# Patient Record
Sex: FEMALE | Race: WHITE | Employment: OTHER | URBAN - NONMETROPOLITAN AREA
[De-identification: names, ages, dates, MRNs, and addresses within clinical notes are randomized per-mention and may not be internally consistent; named-entity substitution may affect disease eponyms.]

---

## 2017-01-05 ENCOUNTER — OFFICE VISIT (OUTPATIENT)
Dept: AUDIOLOGY | Facility: OTHER | Age: 72
End: 2017-01-05
Attending: AUDIOLOGIST
Payer: MEDICARE

## 2017-01-05 DIAGNOSIS — H90.3 SENSORINEURAL HEARING LOSS, ASYMMETRICAL: Primary | ICD-10-CM

## 2017-01-05 PROCEDURE — 92557 COMPREHENSIVE HEARING TEST: CPT | Performed by: AUDIOLOGIST

## 2017-01-05 PROCEDURE — 92567 TYMPANOMETRY: CPT | Performed by: AUDIOLOGIST

## 2017-01-05 NOTE — PROGRESS NOTES
Audiology Evaluation Completed. Please refer SCANNED AUDIOGRAM and/or TYMPANOGRAM for BACKGROUND, RESULTS, RECOMMENDATIONS.    UNDER RECOMMENDATIONS ON AUDIOGRAM PATIENT REFERRED TO ENT WITH SYMPTOMS      Andree BOLDEN, [facility]  Audiologist #[id number]         [facility] REFERRAL/ORDER NEEDED TO ENT BY AUDIOLOGY completed

## 2017-01-18 ENCOUNTER — OFFICE VISIT (OUTPATIENT)
Dept: OTOLARYNGOLOGY | Facility: OTHER | Age: 72
End: 2017-01-18
Attending: PHYSICIAN ASSISTANT
Payer: MEDICARE

## 2017-01-18 VITALS
DIASTOLIC BLOOD PRESSURE: 70 MMHG | SYSTOLIC BLOOD PRESSURE: 112 MMHG | HEART RATE: 90 BPM | TEMPERATURE: 97.6 F | BODY MASS INDEX: 21.98 KG/M2 | WEIGHT: 145 LBS | HEIGHT: 68 IN

## 2017-01-18 DIAGNOSIS — H90.3 SENSORINEURAL HEARING LOSS, ASYMMETRICAL: Primary | ICD-10-CM

## 2017-01-18 PROCEDURE — 99214 OFFICE O/P EST MOD 30 MIN: CPT | Performed by: PHYSICIAN ASSISTANT

## 2017-01-18 PROCEDURE — 99212 OFFICE O/P EST SF 10 MIN: CPT

## 2017-01-18 ASSESSMENT — PAIN SCALES - GENERAL: PAINLEVEL: NO PAIN (0)

## 2017-01-18 NOTE — NURSING NOTE
"Chief Complaint   Patient presents with     Consult     ASNHL       Initial /70 mmHg  Pulse 90  Temp(Src) 97.6  F (36.4  C) (Tympanic)  Ht 5' 8\" (1.727 m)  Wt 145 lb (65.772 kg)  BMI 22.05 kg/m2 Estimated body mass index is 22.05 kg/(m^2) as calculated from the following:    Height as of this encounter: 5' 8\" (1.727 m).    Weight as of this encounter: 145 lb (65.772 kg).  BP completed using cuff size: kristin Wall      "

## 2017-01-18 NOTE — PATIENT INSTRUCTIONS
:  I have discussed options with the patient.  MRI of inner ear to be considered.   Will review prior MRI Brain, to check internal auditory canal.     If you develop vertigo, ear pain, decrease in hearing or fluctuating hearing loss contact staff  If there are concerns or questions, Call [phone] and ask for nurse    Recheck left ear in 6 months (hearing test)

## 2017-01-18 NOTE — MR AVS SNAPSHOT
"              After Visit Summary   1/18/2017    Alycia France    MRN: [id number]           Patient Information     Date Of Birth          1945        Visit Information        Provider Department      1/18/2017 1:15 PM Eugenia Gee PA-C [facility]        Today's Diagnoses     Sensorineural hearing loss, asymmetrical    -  1       Care Instructions    :  I have discussed options with the patient.  MRI of inner ear to be considered.   Will review prior MRI Brain, to check internal auditory canal.     If you develop vertigo, ear pain, decrease in hearing or fluctuating hearing loss contact staff  If there are concerns or questions, Call [phone] and ask for nurse    Recheck left ear in 6 months (hearing test)        Follow-ups after your visit        Who to contact     If you have questions or need follow up information about today's clinic visit or your schedule please contact [facility] directly at [phone].  Normal or non-critical lab and imaging results will be communicated to you by MyChart, letter or phone within 4 business days after the clinic has received the results. If you do not hear from us within 7 days, please contact the clinic through [employer] or phone. If you have a critical or abnormal lab result, we will notify you by phone as soon as possible.  Submit refill requests through Materia or call your pharmacy and they will forward the refill request to us. Please allow 3 business days for your refill to be completed.          Additional Information About Your Visit        MyChart Information     Materia lets you send messages to your doctor, view your test results, renew your prescriptions, schedule appointments and more. To sign up, go to [email or web address] . Click on \"Log in\" on the left side of the screen, which will take you to the Welcome page. Then click on \"Sign up Now\" on the right side of the page.     You will be asked to enter the access code listed " "below, as well as some personal information. Please follow the directions to create your username and password.     Your access code is: [email or web address]  Expires: 3/28/2017  2:34 PM     Your access code will  in 90 days. If you need help or a new code, please call your [facility] clinic or [phone].        Care EveryWhere ID     This is your Care EveryWhere ID. This could be used by other organizations to access your [facility] medical records  [id number]        Your Vitals Were     Pulse Temperature Height BMI (Body Mass Index)          90 97.6  F (36.4  C) (Tympanic) 5' 8\" (1.727 m) 22.05 kg/m2         Blood Pressure from Last 3 Encounters:   17 112/70   16 118/68   16 110/66    Weight from Last 3 Encounters:   17 145 lb (65.772 kg)   16 145 lb (65.772 kg)   16 145 lb (65.772 kg)              Today, you had the following     No orders found for display       Primary Care Provider Office Phone # Fax #    Sugar Quiles [phone]       [place] [address]        Thank you!     Thank you for choosing [facility]  for your care. Our goal is always to provide you with excellent care. Hearing back from our patients is one way we can continue to improve our services. Please take a few minutes to complete the written survey that you may receive in the mail after your visit with us. Thank you!             Your Updated Medication List - Protect others around you: Learn how to safely use, store and throw away your medicines at www.disposemymeds.org.          This list is accurate as of: 17  1:28 PM.  Always use your most recent med list.                   Brand Name Dispense Instructions for use    ADDERALL 30 MG per tablet   Generic drug:  amphetamine-dextroamphetamine      Take 20 mg by mouth 2 times daily       DIPHENHYDRAMINE HCL PO      Take 25 mg by mouth every 6 hours as needed       LATUDA PO      Take 80 " mg by mouth daily       VESICARE PO      Take 10 mg by mouth daily       VITAMIN D (CHOLECALCIFEROL) PO      Take 1,000 Units by mouth daily

## 2017-01-18 NOTE — PROGRESS NOTES
"    Chief Complaint   Patient presents with     Consult     ASNHL       Alycia has no hx of COM or otologic surgeries.    She has no otalgia, otorrhea.    She has no vertigo.    Alycia reports gradual hearing loss. Denies sudden changes.    Alycia has several siblings with hearing aids. No congential hearing loss.    Denies tinnitus. She has no noise exposure.       Audiogram  Mild ASNHL, L>R  Past Medical History   Diagnosis Date     ADD (attention deficit disorder)      Depression      Tardive dyskinesia      COPD (chronic obstructive pulmonary disease) (H)      Anxiety       No Known Allergies  Current Outpatient Prescriptions   Medication     Solifenacin Succinate (VESICARE PO)     Lurasidone HCl (LATUDA PO)     DIPHENHYDRAMINE HCL PO     VITAMIN D, CHOLECALCIFEROL, PO     amphetamine-dextroamphetamine (ADDERALL) 30 MG tablet     No current facility-administered medications for this visit.      ROS: 10 point ROS neg other than the symptoms noted above in the HPI.    /70 mmHg  Pulse 90  Temp(Src) 97.6  F (36.4  C) (Tympanic)  Ht 5' 8\" (1.727 m)  Wt 145 lb (65.772 kg)  BMI 22.05 kg/m2    General - The patient is well nourished and well developed, and appears to have good nutritional status.  Alert and oriented to person and place, answers questions and cooperates with examination appropriately.   Head and Face - Normocephalic and atraumatic, with no gross asymmetry noted.  The facial nerve is intact, with strong symmetric movements.  Slow speech, jaw movement throughout exam.    Voice and Breathing - The patient was breathing comfortably without the use of accessory muscles. There was no wheezing, stridor, or stertor.  The patients voice was clear and strong, and had appropriate pitch and quality.  Ears -The external auditory canals are with minor cerumen the tympanic membranes are intact without effusion, retraction or mass.  Bony landmarks are intact.canals were cleaned under microscopy with cerumen loops. " Tolerated well. NO effusion or retraction.   Eyes - Extraocular movements intact, and the pupils were reactive to light.  Sclera were not icteric or injected, conjunctiva were pink and moist.  Mouth - Examination of the oral cavity showed scattered fillings and periodontitis, with ulcerated lesion against lower incisors appears odontogenic. Tenderness to palpation lower incisors, No thrush or other oral lesion.  Throat - The walls of the oropharynx were smooth, pink, moist, symmetric, and had no lesions or ulcerations.  The tonsillar pillars and soft palate were symmetric.  The uvula was midline on elevation.     Neck - Normal midline excursion of the laryngotracheal complex during swallowing.  Full range of motion on passive movement.  Palpation of the occipital, submental, submandibular, internal jugular chain, and supraclavicular nodes did not demonstrate any abnormal lymph nodes or masses.  Palpation of the thyroid was soft and smooth, with no nodules or goiter appreciated.  The trachea was mobile and midline.  Nose - External contour is symmetric, no gross deflection or scars.  Nasal mucosa is pink and moist with no abnormal mucus.  The septum was intact, turbinates of normal size and position.  No polyps, masses, or purulence noted on examination.        Mild high frequency SNHL, LEFT>RIGHT  Type A tympanograms.         ASSESSMENT:    ICD-10-CM    1. Sensorineural hearing loss, asymmetrical H90.5 AUDIOLOGY ADULT REFERRAL            I have discussed options with the patient.  MRI of inner ear to be considered.   Will review prior MRI Brain, to check internal auditory canal.     If you develop vertigo, ear pain, decrease in hearing or fluctuating hearing loss contact staff    Recheck left ear in 6 months (hearing test)   I have discussed options with the patient.  MRI of the IAC was ordered at this time to rule out retrocochlear lesions.    The loss of hearing in the one ear leaves only one that is functioning  well.  It becomes imperative that the remaining ear be protected from noise exposure and if hearing loss were to occur in the remaining ear, the patient needs to be seen in a few days so as to commence timely work-up and management.  The specifics of this were reviewed with the patient.      Eugenia Gee PA-C  ENT  [facility] [place], [place]  [phone]

## 2017-01-19 ENCOUNTER — TELEPHONE (OUTPATIENT)
Dept: OTOLARYNGOLOGY | Facility: OTHER | Age: 72
End: 2017-01-19

## 2017-01-19 NOTE — TELEPHONE ENCOUNTER
I spoke with pt to notify her that Eugenia would recommend a new Mri iac.  Her last one is over 2 years old.  Pt has an audiogram in 6 months.  She would like to do the Mri iac after that.

## 2017-07-18 ENCOUNTER — OFFICE VISIT (OUTPATIENT)
Dept: AUDIOLOGY | Facility: OTHER | Age: 72
End: 2017-07-18
Attending: AUDIOLOGIST
Payer: MEDICARE

## 2017-07-18 DIAGNOSIS — H90.3 SENSORINEURAL HEARING LOSS, BILATERAL: Primary | ICD-10-CM

## 2017-07-18 DIAGNOSIS — H90.3 SENSORINEURAL HEARING LOSS, ASYMMETRICAL: ICD-10-CM

## 2017-07-18 PROCEDURE — 92552 PURE TONE AUDIOMETRY AIR: CPT | Performed by: AUDIOLOGIST

## 2017-07-18 PROCEDURE — 92556 SPEECH AUDIOMETRY COMPLETE: CPT | Performed by: AUDIOLOGIST

## 2017-07-18 NOTE — PROGRESS NOTES
Audiology Evaluation Completed. Please refer SCANNED AUDIOGRAM and/or TYMPANOGRAM for BACKGROUND, RESULTS, RECOMMENDATIONS.    Andree BOLDEN, [facility]-A  Audiologist #[id number]

## 2017-07-18 NOTE — MR AVS SNAPSHOT
"              After Visit Summary   2017    Alycia France    MRN: [id number]           Patient Information     Date Of Birth          1945        Visit Information        Provider Department      2017 1:00 PM Andree Deras AuD [facility]        Today's Diagnoses     Sensorineural hearing loss, bilateral    -  1    Sensorineural hearing loss, asymmetrical           Follow-ups after your visit        Who to contact     If you have questions or need follow up information about today's clinic visit or your schedule please contact [facility] directly at [phone].  Normal or non-critical lab and imaging results will be communicated to you by Ev[employer], letter or phone within 4 business days after the clinic has received the results. If you do not hear from us within 7 days, please contact the clinic through [employer] or phone. If you have a critical or abnormal lab result, we will notify you by phone as soon as possible.  Submit refill requests through Metaps or call your pharmacy and they will forward the refill request to us. Please allow 3 business days for your refill to be completed.          Additional Information About Your Visit        MyChart Information     Metaps lets you send messages to your doctor, view your test results, renew your prescriptions, schedule appointments and more. To sign up, go to [email or web address] . Click on \"Log in\" on the left side of the screen, which will take you to the Welcome page. Then click on \"Sign up Now\" on the right side of the page.     You will be asked to enter the access code listed below, as well as some personal information. Please follow the directions to create your username and password.     Your access code is: [email or web address]  Expires: 10/16/2017  1:18 PM     Your access code will  in 90 days. If you need help or a new code, please call your [facility] or [phone].        Care EveryWhere ID     This is " your Care EveryWhere ID. This could be used by other organizations to access your [facility] medical records  [id number]         Blood Pressure from Last 3 Encounters:   01/18/17 112/70   12/28/16 118/68   07/28/16 110/66    Weight from Last 3 Encounters:   01/18/17 145 lb (65.8 kg)   12/28/16 145 lb (65.8 kg)   07/28/16 145 lb (65.8 kg)              We Performed the Following     AUDIOGRAM/TYMPANOGRAM - INTERFACE     AUDIOLOGY ADULT REFERRAL        Primary Care Provider Office Phone # Fax #    Sugar Quiles [phone]       [address] [address]        Equal Access to Services     DIANE GAN : Hadii boom alvao [place], waaxda luqadaha, qaybta kaalmada adeegyada, odilia loo . So [facility] [phone].    ATENCIÓN: Si habla español, tiene a welch disposición servicios gratuitos de asistencia lingüística. Ll[facility] Foundation [phone].    We comply with applicable federal civil rights laws and [place] laws. We do not discriminate on the basis of race, color, national origin, age, disability sex, sexual orientation or gender identity.            Thank you!     Thank you for choosing [facility]  for your care. Our goal is always to provide you with excellent care. Hearing back from our patients is one way we can continue to improve our services. Please take a few minutes to complete the written survey that you may receive in the mail after your visit with us. Thank you!             Your Updated Medication List - Protect others around you: Learn how to safely use, store and throw away your medicines at www.disposemymeds.org.          This list is accurate as of: 7/18/17  1:18 PM.  Always use your most recent med list.                   Brand Name Dispense Instructions for use Diagnosis    ADDERALL 30 MG per tablet   Generic drug:  amphetamine-dextroamphetamine      Take 20 mg by mouth 2 times daily        DIPHENHYDRAMINE HCL PO       Take 25 mg by mouth every 6 hours as needed        LATUDA PO      Take 80 mg by mouth daily        VESICARE PO      Take 10 mg by mouth daily        VITAMIN D (CHOLECALCIFEROL) PO      Take 1,000 Units by mouth daily

## 2018-07-12 ENCOUNTER — TRANSFERRED RECORDS (OUTPATIENT)
Dept: HEALTH INFORMATION MANAGEMENT | Facility: HOSPITAL | Age: 73
End: 2018-07-12

## 2019-01-01 ENCOUNTER — HOSPITAL ENCOUNTER (INPATIENT)
Facility: HOSPITAL | Age: 74
LOS: 2 days | Discharge: HOME | DRG: 885 | End: 2019-09-15
Attending: FAMILY MEDICINE | Admitting: PSYCHIATRY & NEUROLOGY
Payer: MEDICARE

## 2019-01-01 ENCOUNTER — TELEPHONE (OUTPATIENT)
Dept: BEHAVIORAL HEALTH | Facility: CLINIC | Age: 74
End: 2019-01-01

## 2019-01-01 VITALS
RESPIRATION RATE: 12 BRPM | TEMPERATURE: 97.7 F | SYSTOLIC BLOOD PRESSURE: 112 MMHG | OXYGEN SATURATION: 97 % | DIASTOLIC BLOOD PRESSURE: 64 MMHG | HEART RATE: 65 BPM

## 2019-01-01 DIAGNOSIS — R45.851 SUICIDAL IDEATION: ICD-10-CM

## 2019-01-01 DIAGNOSIS — F41.1 GENERALIZED ANXIETY DISORDER: Primary | ICD-10-CM

## 2019-01-01 DIAGNOSIS — F33.2 SEVERE EPISODE OF RECURRENT MAJOR DEPRESSIVE DISORDER, WITHOUT PSYCHOTIC FEATURES (H): ICD-10-CM

## 2019-01-01 LAB
ALBUMIN SERPL-MCNC: 4.2 G/DL (ref 3.4–5)
ALBUMIN UR-MCNC: NEGATIVE MG/DL
ALP SERPL-CCNC: 47 U/L (ref 40–150)
ALT SERPL W P-5'-P-CCNC: 21 U/L (ref 0–50)
AMPHETAMINES UR QL: NOT DETECTED NG/ML
ANION GAP SERPL CALCULATED.3IONS-SCNC: 5 MMOL/L (ref 3–14)
APPEARANCE UR: ABNORMAL
AST SERPL W P-5'-P-CCNC: 12 U/L (ref 0–45)
BACTERIA #/AREA URNS HPF: ABNORMAL /HPF
BARBITURATES UR QL SCN: NOT DETECTED NG/ML
BASOPHILS # BLD AUTO: 0.1 10E9/L (ref 0–0.2)
BASOPHILS NFR BLD AUTO: 1.1 %
BENZODIAZ UR QL SCN: NOT DETECTED NG/ML
BILIRUB SERPL-MCNC: 0.3 MG/DL (ref 0.2–1.3)
BILIRUB UR QL STRIP: NEGATIVE
BUN SERPL-MCNC: 12 MG/DL (ref 7–30)
BUPRENORPHINE UR QL: NOT DETECTED NG/ML
CALCIUM SERPL-MCNC: 9.1 MG/DL (ref 8.5–10.1)
CANNABINOIDS UR QL: NOT DETECTED NG/ML
CHLORIDE SERPL-SCNC: 108 MMOL/L (ref 94–109)
CO2 SERPL-SCNC: 29 MMOL/L (ref 20–32)
COCAINE UR QL SCN: NOT DETECTED NG/ML
COLOR UR AUTO: YELLOW
CREAT SERPL-MCNC: 1.01 MG/DL (ref 0.52–1.04)
D-METHAMPHET UR QL: NOT DETECTED NG/ML
DIFFERENTIAL METHOD BLD: NORMAL
EOSINOPHIL # BLD AUTO: 0.2 10E9/L (ref 0–0.7)
EOSINOPHIL NFR BLD AUTO: 2.4 %
ERYTHROCYTE [DISTWIDTH] IN BLOOD BY AUTOMATED COUNT: 12.9 % (ref 10–15)
ETHANOL SERPL-MCNC: <0.01 G/DL
GFR SERPL CREATININE-BSD FRML MDRD: 54 ML/MIN/{1.73_M2}
GLUCOSE SERPL-MCNC: 99 MG/DL (ref 70–99)
GLUCOSE UR STRIP-MCNC: NEGATIVE MG/DL
HCT VFR BLD AUTO: 43.8 % (ref 35–47)
HGB BLD-MCNC: 14.2 G/DL (ref 11.7–15.7)
HGB UR QL STRIP: NEGATIVE
IMM GRANULOCYTES # BLD: 0 10E9/L (ref 0–0.4)
IMM GRANULOCYTES NFR BLD: 0.2 %
KETONES UR STRIP-MCNC: NEGATIVE MG/DL
LEUKOCYTE ESTERASE UR QL STRIP: ABNORMAL
LYMPHOCYTES # BLD AUTO: 2.3 10E9/L (ref 0.8–5.3)
LYMPHOCYTES NFR BLD AUTO: 36.9 %
MCH RBC QN AUTO: 31 PG (ref 26.5–33)
MCHC RBC AUTO-ENTMCNC: 32.4 G/DL (ref 31.5–36.5)
MCV RBC AUTO: 96 FL (ref 78–100)
METHADONE UR QL SCN: NOT DETECTED NG/ML
MONOCYTES # BLD AUTO: 0.5 10E9/L (ref 0–1.3)
MONOCYTES NFR BLD AUTO: 8 %
MUCOUS THREADS #/AREA URNS LPF: PRESENT /LPF
NEUTROPHILS # BLD AUTO: 3.2 10E9/L (ref 1.6–8.3)
NEUTROPHILS NFR BLD AUTO: 51.4 %
NITRATE UR QL: NEGATIVE
NRBC # BLD AUTO: 0 10*3/UL
NRBC BLD AUTO-RTO: 0 /100
OPIATES UR QL SCN: NOT DETECTED NG/ML
OXYCODONE UR QL SCN: NOT DETECTED NG/ML
PCP UR QL SCN: NOT DETECTED NG/ML
PH UR STRIP: 6 PH (ref 4.7–8)
PLATELET # BLD AUTO: 321 10E9/L (ref 150–450)
POTASSIUM SERPL-SCNC: 4.2 MMOL/L (ref 3.4–5.3)
PROPOXYPH UR QL: NOT DETECTED NG/ML
PROT SERPL-MCNC: 7.9 G/DL (ref 6.8–8.8)
RBC # BLD AUTO: 4.58 10E12/L (ref 3.8–5.2)
RBC #/AREA URNS AUTO: 2 /HPF (ref 0–2)
SODIUM SERPL-SCNC: 142 MMOL/L (ref 133–144)
SOURCE: ABNORMAL
SP GR UR STRIP: 1.03 (ref 1–1.03)
SQUAMOUS #/AREA URNS AUTO: 40 /HPF (ref 0–1)
TRICYCLICS UR QL SCN: NOT DETECTED NG/ML
UROBILINOGEN UR STRIP-MCNC: NORMAL MG/DL (ref 0–2)
WBC # BLD AUTO: 6.2 10E9/L (ref 4–11)
WBC #/AREA URNS AUTO: 3 /HPF (ref 0–5)

## 2019-01-01 PROCEDURE — 25000132 ZZH RX MED GY IP 250 OP 250 PS 637: Mod: GY | Performed by: NURSE PRACTITIONER

## 2019-01-01 PROCEDURE — 99222 1ST HOSP IP/OBS MODERATE 55: CPT | Performed by: NURSE PRACTITIONER

## 2019-01-01 PROCEDURE — 99284 EMERGENCY DEPT VISIT MOD MDM: CPT

## 2019-01-01 PROCEDURE — 12400000 ZZH R&B MH

## 2019-01-01 PROCEDURE — 81001 URINALYSIS AUTO W/SCOPE: CPT | Performed by: FAMILY MEDICINE

## 2019-01-01 PROCEDURE — 25000132 ZZH RX MED GY IP 250 OP 250 PS 637: Mod: GY | Performed by: FAMILY MEDICINE

## 2019-01-01 PROCEDURE — 80320 DRUG SCREEN QUANTALCOHOLS: CPT | Performed by: FAMILY MEDICINE

## 2019-01-01 PROCEDURE — 99232 SBSQ HOSP IP/OBS MODERATE 35: CPT | Performed by: NURSE PRACTITIONER

## 2019-01-01 PROCEDURE — 36415 COLL VENOUS BLD VENIPUNCTURE: CPT | Performed by: FAMILY MEDICINE

## 2019-01-01 PROCEDURE — 80306 DRUG TEST PRSMV INSTRMNT: CPT | Performed by: FAMILY MEDICINE

## 2019-01-01 PROCEDURE — 85025 COMPLETE CBC W/AUTO DIFF WBC: CPT | Performed by: FAMILY MEDICINE

## 2019-01-01 PROCEDURE — 99283 EMERGENCY DEPT VISIT LOW MDM: CPT | Mod: Z6 | Performed by: FAMILY MEDICINE

## 2019-01-01 PROCEDURE — 80053 COMPREHEN METABOLIC PANEL: CPT | Performed by: FAMILY MEDICINE

## 2019-01-01 RX ORDER — GABAPENTIN 100 MG/1
100 CAPSULE ORAL AT BEDTIME
Status: DISCONTINUED | OUTPATIENT
Start: 2019-01-01 | End: 2019-01-01 | Stop reason: HOSPADM

## 2019-01-01 RX ORDER — HYDROXYZINE HYDROCHLORIDE 25 MG/1
25 TABLET, FILM COATED ORAL 3 TIMES DAILY PRN
Qty: 90 TABLET | Refills: 0 | Status: ON HOLD | OUTPATIENT
Start: 2019-01-01 | End: 2020-01-01

## 2019-01-01 RX ORDER — ESZOPICLONE 1 MG/1
TABLET, FILM COATED ORAL AT BEDTIME
Status: ON HOLD | COMMUNITY
End: 2019-01-01

## 2019-01-01 RX ORDER — ACETAMINOPHEN 325 MG/1
650 TABLET ORAL EVERY 4 HOURS PRN
Status: DISCONTINUED | OUTPATIENT
Start: 2019-01-01 | End: 2019-01-01 | Stop reason: HOSPADM

## 2019-01-01 RX ORDER — ESZOPICLONE 3 MG/1
3 TABLET, FILM COATED ORAL AT BEDTIME
Status: DISCONTINUED | OUTPATIENT
Start: 2019-01-01 | End: 2019-01-01

## 2019-01-01 RX ORDER — LORAZEPAM 1 MG/1
1 TABLET ORAL DAILY PRN
Status: DISCONTINUED | OUTPATIENT
Start: 2019-01-01 | End: 2019-01-01 | Stop reason: HOSPADM

## 2019-01-01 RX ORDER — SOLIFENACIN SUCCINATE 10 MG/1
10 TABLET, FILM COATED ORAL DAILY
COMMUNITY

## 2019-01-01 RX ORDER — GABAPENTIN 100 MG/1
100 CAPSULE ORAL AT BEDTIME
COMMUNITY

## 2019-01-01 RX ORDER — DEXTROAMPHETAMINE SACCHARATE, AMPHETAMINE ASPARTATE, DEXTROAMPHETAMINE SULFATE AND AMPHETAMINE SULFATE 5; 5; 5; 5 MG/1; MG/1; MG/1; MG/1
40 TABLET ORAL DAILY
Status: ON HOLD | COMMUNITY
End: 2020-01-01

## 2019-01-01 RX ORDER — TOLTERODINE 4 MG/1
4 CAPSULE, EXTENDED RELEASE ORAL DAILY
Status: DISCONTINUED | OUTPATIENT
Start: 2019-01-01 | End: 2019-01-01 | Stop reason: HOSPADM

## 2019-01-01 RX ORDER — ESZOPICLONE 3 MG/1
3 TABLET, FILM COATED ORAL AT BEDTIME
Status: ON HOLD | COMMUNITY
End: 2020-01-01

## 2019-01-01 RX ORDER — GABAPENTIN 100 MG/1
CAPSULE ORAL AT BEDTIME
Status: ON HOLD | COMMUNITY
End: 2019-01-01

## 2019-01-01 RX ORDER — LORAZEPAM 1 MG/1
1 TABLET ORAL DAILY PRN
Status: ON HOLD | COMMUNITY
End: 2020-01-01

## 2019-01-01 RX ORDER — TRAZODONE HYDROCHLORIDE 50 MG/1
50 TABLET, FILM COATED ORAL
Status: DISCONTINUED | OUTPATIENT
Start: 2019-01-01 | End: 2019-01-01 | Stop reason: HOSPADM

## 2019-01-01 RX ORDER — SALIVA STIMULANT COMB. NO.3
2 SPRAY, NON-AEROSOL (ML) MUCOUS MEMBRANE 4 TIMES DAILY PRN
Status: DISCONTINUED | OUTPATIENT
Start: 2019-01-01 | End: 2019-01-01 | Stop reason: HOSPADM

## 2019-01-01 RX ORDER — NICOTINE 21 MG/24HR
1 PATCH, TRANSDERMAL 24 HOURS TRANSDERMAL DAILY
Status: DISCONTINUED | OUTPATIENT
Start: 2019-01-01 | End: 2019-01-01 | Stop reason: HOSPADM

## 2019-01-01 RX ORDER — HYDROXYZINE HYDROCHLORIDE 25 MG/1
25-50 TABLET, FILM COATED ORAL EVERY 4 HOURS PRN
Status: DISCONTINUED | OUTPATIENT
Start: 2019-01-01 | End: 2019-01-01 | Stop reason: HOSPADM

## 2019-01-01 RX ADMIN — TOLTERODINE 4 MG: 4 CAPSULE, EXTENDED RELEASE ORAL at 08:37

## 2019-01-01 RX ADMIN — TOLTERODINE 4 MG: 4 CAPSULE, EXTENDED RELEASE ORAL at 08:28

## 2019-01-01 RX ADMIN — HYDROXYZINE HYDROCHLORIDE 50 MG: 25 TABLET ORAL at 08:32

## 2019-01-01 RX ADMIN — HYDROXYZINE HYDROCHLORIDE 50 MG: 25 TABLET ORAL at 08:36

## 2019-01-01 RX ADMIN — NICOTINE 1 PATCH: 14 PATCH, EXTENDED RELEASE TRANSDERMAL at 08:37

## 2019-01-01 RX ADMIN — Medication 2 SPRAY: at 21:04

## 2019-01-01 RX ADMIN — NICOTINE 1 PATCH: 14 PATCH, EXTENDED RELEASE TRANSDERMAL at 08:28

## 2019-01-01 RX ADMIN — LORAZEPAM 1 MG: 1 TABLET ORAL at 21:03

## 2019-01-01 RX ADMIN — GABAPENTIN 100 MG: 100 CAPSULE ORAL at 21:03

## 2019-01-01 RX ADMIN — HYDROXYZINE HYDROCHLORIDE 50 MG: 25 TABLET ORAL at 15:49

## 2019-01-01 RX ADMIN — NICOTINE 1 PATCH: 14 PATCH, EXTENDED RELEASE TRANSDERMAL at 14:07

## 2019-01-01 ASSESSMENT — ACTIVITIES OF DAILY LIVING (ADL)
HYGIENE/GROOMING: INDEPENDENT
TRANSFERRING: 0-->INDEPENDENT
RETIRED_COMMUNICATION: 0-->UNDERSTANDS/COMMUNICATES WITHOUT DIFFICULTY
ORAL_HYGIENE: INDEPENDENT
RETIRED_EATING: 0-->INDEPENDENT
ORAL_HYGIENE: INDEPENDENT
FALL_HISTORY_WITHIN_LAST_SIX_MONTHS: NO
HYGIENE/GROOMING: INDEPENDENT
LAUNDRY: UNABLE TO COMPLETE
TOILETING: 0-->INDEPENDENT
DRESS: SCRUBS (BEHAVIORAL HEALTH)
DRESS: SCRUBS (BEHAVIORAL HEALTH)
AMBULATION: 0-->INDEPENDENT
LAUNDRY: UNABLE TO COMPLETE
HYGIENE/GROOMING: INDEPENDENT
SWALLOWING: 0-->SWALLOWS FOODS/LIQUIDS WITHOUT DIFFICULTY
ORAL_HYGIENE: INDEPENDENT
DRESS: 0-->INDEPENDENT
HYGIENE/GROOMING: INDEPENDENT
ORAL_HYGIENE: INDEPENDENT
BATHING: 0-->INDEPENDENT
COGNITION: 0 - NO COGNITION ISSUES REPORTED

## 2019-01-01 ASSESSMENT — ENCOUNTER SYMPTOMS
NECK PAIN: 0
FEVER: 0
WEAKNESS: 0
ABDOMINAL PAIN: 0
NAUSEA: 0
DYSPHORIC MOOD: 1
SHORTNESS OF BREATH: 0
PALPITATIONS: 0
DIARRHEA: 0
BACK PAIN: 0
VOMITING: 0
CONSTIPATION: 0
FATIGUE: 0
NERVOUS/ANXIOUS: 1
ACTIVITY CHANGE: 1
LIGHT-HEADEDNESS: 0
DYSURIA: 0

## 2019-01-24 DIAGNOSIS — R06.83 SNORING: Primary | ICD-10-CM

## 2019-02-05 ENCOUNTER — DOCUMENTATION ONLY (OUTPATIENT)
Dept: SLEEP MEDICINE | Facility: HOSPITAL | Age: 74
End: 2019-02-05

## 2019-02-05 NOTE — PROGRESS NOTES
Chart review prior to sleep testing.    Patient Summary:  72 yo F with history of tardive dyskinesia, COPD, ADD, depression with presumed psychotic features or possible schizoaffective disorder NOS, anxiety, nocturnal hypoxemia without OBDULIO referred by unknown provider for unknown reason.    STOP-BANG score of 1.  [place] score of 2.  BMI of unknown.    Last visit with Dr. Smith on 7/28/2016, treated with nocturnal supplemental oxygen at presumed 2 LPM and report of largely normal overnight oximetry on room air.  Not felt to strongly need oxygen therapy.    Overnight oximetry on room air performed 6/15/2016 with recording time of 4:16:40, valid time 4:16:32, mean SpO2 94.2%, phil SpO2 88%, time of SpO2 <= 88% of 1.75 minutes.    Prior PSG on 9/24/2012 without reported OBDULIO and started on nocturnal supplemental oxygen, though only a partial report was found in scanned documents.  Here per tech notes, stated to have frequent hypopneas and was started CPAP, but with persistent hypoxemia.  CPAP titrated to 9 cm H2O for hypoxemia and does seem to have had normalization of SpO2 to ~90%, including REM.  I did not see any mention of start of supplemental oxygen during titration, nor documented on tech notes.  Chart also lists medications including pristiq, abilify, risperidone.    Most recent PFT's: none found in EMR    Most recent echocardiogram: none found in EMR.    A/P:  1.)  Unclear reason for sleep consultation at this time, but presumed related to history of presumed significant OBDULIO with sleep-associated hypoxemia.   - Given the somewhat unclear history in regards to hypoxemia, would likely recommend all-night PAP titration PSG with potential need for nocturnal supplemental oxygen.

## 2019-02-05 NOTE — PROGRESS NOTES
SLEEP HISTORY QUESTIONNAIRE    Please describe the main reason for your sleep appointment? EDS  Can not sleep at night    How long has this been a problem? Several years    Have you been diagnosed with a sleep problem in the past? YES    If so, what? Not getting enough oxygen    What treatment was recommended? oxygen    Have you had a sleep study in the past? YES    If yes, where and when? [place]  Six years ago (9/24/12)    Sleep Habits:   Do you read in bed? Yes  Do you eat in bed? No  Do you watch TV in bed? No  Do you work in bed? No  Do you use a phone or computer in bed? No    Is you sleep disturbed by:   Bed partner: No  Children: No  Noise: No   Pets: No  Other:       On two or more nights per week, do you drink alcohol to help you fall asleep?NO    On two or more nights per week, do you take melatonin to help you fall asleep? NO    On two or more nights per week, do you take over the counter medicine to fall asleep?  NO    Do you take drinks with caffeine (coffee, tea, soda, energy drinks)? YES    Do you have 3 or more caffeine drinks in a day? NO    Do you have caffeine drinks within 6 hours of bedtime? NO    Do you smoke or use tobacco? YES    Do you exercise? YES    Sleep Routine:   Using a 24 Hour Clock    What time do you usually get into bed on workdays? 10 pm    Weekend/non work days? 10 pm    What time do you get out of bed on workdays? 8 am      Weekend/non work days?8 am    Do you work the evening or night shift or do your shifts rotate? NO    How long does it usually take to fall to sleep? 2 hours some times don't sleep at all    How many times do you wake during the night? 2-3    How much time do you feel that you are awake during the entire night? 1-8 hours    How long does it take for you to fall back to sleep after you wake up? 1 + hours    Why do you think you wake up? Go tp the bathroom or need to move    What do you do when you wake up? Go to bathroom and read    How much sleep do you  think you get on work nights? 6 hours    How much sleep do you think you get on weekends/non work days? 6 hours    How much sleep do you think you need to feel your best? 8 hours    How many days during a week do you take a nap on average? none    What is the average length of your naps?     Do you feel better after taking a nap? DOES NOT APPLY    If you could chose the best sleep schedule for you, what time would you go to bed? 10  What time would you get up? 7    Do you read in bed? YES    Do you eat in bed? NO    Do you watch TV in bed? NO    Do you do work in bed? NO    Do you use a computer or phone in bed? NO    Sleep Disruptions?   Leg movements:  Do you ever have restless, crawling, aching or other unusual feelings in your legs? NO    Do you ever wake yourself by kicking your legs during the night? NO    Are the sheets and blankets messed up or tossed about when you get up? NO    Night-time behaviors:   Do you have nightmares or night terrors? NO   How often?     Have you had times when you were sleep walking? NO    Have you been seen doing anything unusual while you sleep at nights? DOES NOT APPLY  What?   How often?     Have you ever hurt yourself or someone else while you were sleeping? NO  Please describe:     Do you clench or grind your teeth during the night? no    Sleep Apnea (pauses in breathing during sleep):  Do you wake with a headache in the morning? NO  How often?     Does your bed partner, family or friends ever say that you snore? YES  How many nights per week do you snore?   Can snoring be heard outside the bedroom? mild    Do you ever wake yourself up from snoring, gasping or choking? NO    Have you ever been told that you stop breathing or have pauses in your breathing? NO    Do you wake in the morning with a dry throat or mouth? YES    Do you have trouble breathing through your nose? YES    Do you have problems with heartburn, reflux or a hiatal hernia? NO    Which positions do you usually  sleep in? (stomach, back, sides, all) sides    Do you use oxygen or any other medical equipment when you sleep? NO    Do members of your family (related by blood) snore? unknown    Have any members of your family been diagnosed with with sleep apnea? unknown    Do other members of your family have restless leg? unknown    Do other members of your family have sleep walking? unknown    Have you ever had an accident, or near accident due to sleepiness while driving? NO    Does your sleepiness affect your work on the job or at school? NO    Do you ever fall asleep by accident while doing a task? NO    Have you had sudden muscle weakness when laughing, angry or surprised? NO    Have you ever been unable to move your body when falling asleep or waking up? NO    Do you ever have trouble  your dreams from real life events? NO  Please describe:     Physical Health: (including illness and injury): During the past 30 days, on how many days was your physical health not good? 0/30 days     Mental Health: (including stress, depression, and problems with emotions): During the last 30 days, how may days was your mental health not good? 30/30 days.     During the past 30 days, on how many days did poor physical or mental health keep you from doing your usual activities? This might be self-care, work, or play? 30/30 days.     Social History:   Marital status: divorce    Who lives in your home with you? Lives alone    Mother (alive or dead)? dead If has , from what? cancer  Father (alive or dead)? dead If has , from what? Old age (101)    Siblings: YES  Have any ? YES  If so, from what? cancer    Currently working? 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     Sleepiness Scale:   Sitting and reading 1   Watching TV 1   Sitting in a public place 0   Riding in a car 0   Lying down to rest in the afternoon 0   Sitting and talking to someone 0   Sitting quietly after a lunch without alcohol 0   In a  car, stopping for a few minutes in traffic 0       Surgical History:   Past Surgical History:   Procedure Laterality Date     APPENDECTOMY       CHOLECYSTECTOMY       SIGMOIDOSCOPY RIGID       thyroid resection, benign tumor         Medical Conditions:   Past Medical History:   Diagnosis Date     ADD (attention deficit disorder)      Anxiety      COPD (chronic obstructive pulmonary disease) (H)      Depression      Tardive dyskinesia        Medications:   Current Outpatient Medications   Medication Sig     amphetamine-dextroamphetamine (ADDERALL) 30 MG tablet Take 20 mg by mouth 2 times daily      DIPHENHYDRAMINE HCL PO Take 25 mg by mouth every 6 hours as needed     Lurasidone HCl (LATUDA PO) Take 80 mg by mouth daily     Solifenacin Succinate (VESICARE PO) Take 10 mg by mouth daily     VITAMIN D, CHOLECALCIFEROL, PO Take 1,000 Units by mouth daily     No current facility-administered medications for this visit.        Are you currently having any of the following symptoms?   General:   Obvious weight gain or loss NO  Fever, chills or sweats NO  Drug allergies:     Eyes:   Changes in vision NO  Blind spots NO  Double vision NO  Other     Ear, Nose and Throat:   Ear pain NO  Sore throat NO  Sinus pain NO  Post-nasal drip NO  Runny nose YES  Bloody nose NO    Heart:   Rapid or irregular heart beat NO  Chest pain or pressure NO  Out of breath when lying down NO  Swelling in feet or legs NO  High blood pressure NO  Heart disease NO    Nervous system   Headaches NO  Weakness in arms or legs NO  Numbness in arms of legs NO  Other:     Skin  Rashes NO  New moles or skin changes NO  Other     Lungs  Shortness of breath at rest NO  Shortness of breath with activity YES  Dry cough NO  Coughing up mucous or phlegm YES  Coughing up blood NO  Wheezing when breathing NO    Lymph System  Swollen lymph nodes NO  New lumps or bumps NO  Changes in breasts or discharge NO    Digestive System   Nausea or vomiting NO  Loose or watery  stools NO  Hard, dry stools (constipation) YES  Fat or grease in stools NO  Blood in stools NO  Stools are black or bloody NO  Abdominal (belly) pain NO    Urinary Tract   Pain when you urinate (pee) NO  Blood in your urine NO  Urinate (pee) more than normal NO  Irregular periods NO    Muscles and bones   Muscle pain NO  Joint or bone pain NO  Swollen joints NO  Other     Glands  Increased thirst or urination NO  Diabetes NO  Morning glucose:   Afternoon glucose:     Mental Health  Depression YES  Anxiety YES  Other mental health issues:

## 2019-02-05 NOTE — PROGRESS NOTES
DIANA SHAH       Name: Alycia France MRN# [id number]   Age: 73 year old YOB: 1945     Stop Bang questionnaire completed with a score of >3 to allow for HST     Have you been told you snore loudly (louder than talking or loud enough to be heard through doors)? NO    Do you often feel tired, fatigued, or sleepy during the daytime? NO    Has anyone observed you stop breathing during your sleep? NO    Do you have or are you being treated for high blood pressure? NO    Is your BMI greater than 35? NO    Is your neck size circumference 16 inches or greater? NO    Are you over 50 years old? YES    Stop Bang Score (# of yes): 1

## 2019-04-22 ENCOUNTER — OFFICE VISIT (OUTPATIENT)
Dept: AUDIOLOGY | Facility: OTHER | Age: 74
End: 2019-04-22
Attending: AUDIOLOGIST
Payer: MEDICARE

## 2019-04-22 DIAGNOSIS — H90.3 SENSORINEURAL HEARING LOSS, BILATERAL: Primary | ICD-10-CM

## 2019-04-22 PROCEDURE — 92557 COMPREHENSIVE HEARING TEST: CPT | Performed by: AUDIOLOGIST

## 2019-04-22 PROCEDURE — 92550 TYMPANOMETRY & REFLEX THRESH: CPT | Performed by: AUDIOLOGIST

## 2019-04-22 NOTE — PROGRESS NOTES
Audiology Evaluation Completed. Please refer SCANNED AUDIOGRAM and/or TYMPANOGRAM for BACKGROUND, RESULTS, RECOMMENDATIONS.      Andree BOLDEN, [facility]  Audiologist #[id number]

## 2019-05-27 ENCOUNTER — HOSPITAL ENCOUNTER (EMERGENCY)
Facility: HOSPITAL | Age: 74
Discharge: HOME | End: 2019-05-27
Attending: PHYSICIAN ASSISTANT | Admitting: PHYSICIAN ASSISTANT
Payer: MEDICARE

## 2019-05-27 VITALS
BODY MASS INDEX: 24.91 KG/M2 | DIASTOLIC BLOOD PRESSURE: 72 MMHG | OXYGEN SATURATION: 98 % | WEIGHT: 155 LBS | SYSTOLIC BLOOD PRESSURE: 136 MMHG | TEMPERATURE: 98 F | RESPIRATION RATE: 18 BRPM | HEIGHT: 66 IN
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
## 2019-05-27 DIAGNOSIS — S51.859A CAT BITE OF FOREARM, INITIAL ENCOUNTER: ICD-10-CM

## 2019-05-27 LAB
ANION GAP SERPL CALCULATED.3IONS-SCNC: 5 MMOL/L (ref 3–14)
BASOPHILS # BLD AUTO: 0.1 10E9/L (ref 0–0.2)
BASOPHILS NFR BLD AUTO: 0.8 %
BUN SERPL-MCNC: 10 MG/DL (ref 7–30)
CALCIUM SERPL-MCNC: 8.9 MG/DL (ref 8.5–10.1)
CHLORIDE SERPL-SCNC: 105 MMOL/L (ref 94–109)
CO2 SERPL-SCNC: 30 MMOL/L (ref 20–32)
CREAT SERPL-MCNC: 0.86 MG/DL (ref 0.52–1.04)
DIFFERENTIAL METHOD BLD: NORMAL
EOSINOPHIL # BLD AUTO: 0.1 10E9/L (ref 0–0.7)
EOSINOPHIL NFR BLD AUTO: 1.4 %
ERYTHROCYTE [DISTWIDTH] IN BLOOD BY AUTOMATED COUNT: 13 % (ref 10–15)
GFR SERPL CREATININE-BSD FRML MDRD: 66 ML/MIN/{1.73_M2}
GLUCOSE SERPL-MCNC: 108 MG/DL (ref 70–99)
HCT VFR BLD AUTO: 40.9 % (ref 35–47)
HGB BLD-MCNC: 13.5 G/DL (ref 11.7–15.7)
IMM GRANULOCYTES # BLD: 0 10E9/L (ref 0–0.4)
IMM GRANULOCYTES NFR BLD: 0.3 %
LYMPHOCYTES # BLD AUTO: 1.7 10E9/L (ref 0.8–5.3)
LYMPHOCYTES NFR BLD AUTO: 22 %
MCH RBC QN AUTO: 31.5 PG (ref 26.5–33)
MCHC RBC AUTO-ENTMCNC: 33 G/DL (ref 31.5–36.5)
MCV RBC AUTO: 95 FL (ref 78–100)
MONOCYTES # BLD AUTO: 0.7 10E9/L (ref 0–1.3)
MONOCYTES NFR BLD AUTO: 8.6 %
NEUTROPHILS # BLD AUTO: 5.2 10E9/L (ref 1.6–8.3)
NEUTROPHILS NFR BLD AUTO: 66.9 %
NRBC # BLD AUTO: 0 10*3/UL
NRBC BLD AUTO-RTO: 0 /100
PLATELET # BLD AUTO: 309 10E9/L (ref 150–450)
POTASSIUM SERPL-SCNC: 4.1 MMOL/L (ref 3.4–5.3)
RBC # BLD AUTO: 4.29 10E12/L (ref 3.8–5.2)
SODIUM SERPL-SCNC: 140 MMOL/L (ref 133–144)
WBC # BLD AUTO: 7.8 10E9/L (ref 4–11)

## 2019-05-27 PROCEDURE — 87040 BLOOD CULTURE FOR BACTERIA: CPT | Mod: 59 | Performed by: PHYSICIAN ASSISTANT

## 2019-05-27 PROCEDURE — 80048 BASIC METABOLIC PNL TOTAL CA: CPT | Performed by: PHYSICIAN ASSISTANT

## 2019-05-27 PROCEDURE — 25000128 H RX IP 250 OP 636

## 2019-05-27 PROCEDURE — 99284 EMERGENCY DEPT VISIT MOD MDM: CPT | Mod: 25

## 2019-05-27 PROCEDURE — 99283 EMERGENCY DEPT VISIT LOW MDM: CPT | Mod: Z6 | Performed by: PHYSICIAN ASSISTANT

## 2019-05-27 PROCEDURE — 36415 COLL VENOUS BLD VENIPUNCTURE: CPT | Performed by: PHYSICIAN ASSISTANT

## 2019-05-27 PROCEDURE — 85025 COMPLETE CBC W/AUTO DIFF WBC: CPT | Performed by: PHYSICIAN ASSISTANT

## 2019-05-27 PROCEDURE — 96365 THER/PROPH/DIAG IV INF INIT: CPT

## 2019-05-27 RX ORDER — OXYCODONE AND ACETAMINOPHEN 5; 325 MG/1; MG/1
1-2 TABLET ORAL EVERY 6 HOURS PRN
Qty: 12 TABLET | Refills: 0 | Status: SHIPPED | OUTPATIENT
Start: 2019-05-27 | End: 2019-01-01

## 2019-05-27 ASSESSMENT — ENCOUNTER SYMPTOMS
FEVER: 0
WOUND: 1
COLOR CHANGE: 1
FATIGUE: 0
CHILLS: 0

## 2019-05-27 ASSESSMENT — MIFFLIN-ST. JEOR: SCORE: 1214.83

## 2019-05-27 NOTE — ED NOTES
Discharge instructions reviewed with patient with no questions or concerns. Will follow up tomorrow with PCP. Written Rx given, aware of Rx to . Vital signs stable.

## 2019-05-27 NOTE — ED TRIAGE NOTES
"Patient presents with complaints of a cat bit to the top of her left hand.  States it happened yesterday and was \"her\" cat.  Patient notes worsening pain and that her left hand wrist are red, hot and painful.  Wound not fully assessed in triage; but areas of skin above bandaged areas are red and hot to touch.  CMS intact.  Patient states the cat is UTD on it's shots.  "

## 2019-05-27 NOTE — ED PROVIDER NOTES
History     Chief Complaint   Patient presents with     Cat Bite     The history is provided by the patient.     Alycia France is a 74 year old female who presented to the emergency department ambulatory for evaluation of a cat bite.  The patient was bit by her cat in her left wrist yesterday and developed redness and pain today.  Denies any fevers or chills.  Denies any immunosuppression.  Denies any history of cancer or prednisone usage.  Has pain with extension of the wrist.    Allergies:  No Known Allergies    Problem List:    Patient Active Problem List    Diagnosis Date Noted     ACP (advance care planning) 2016     Priority: Medium     Patient refused information       Oral lesion 2013     Priority: Medium        Past Medical History:    Past Medical History:   Diagnosis Date     ADD (attention deficit disorder)      Anxiety      COPD (chronic obstructive pulmonary disease) (H)      Depression      Tardive dyskinesia        Past Surgical History:    Past Surgical History:   Procedure Laterality Date     APPENDECTOMY       CHOLECYSTECTOMY       SIGMOIDOSCOPY RIGID       thyroid resection, benign tumor         Family History:    Family History   Problem Relation Age of Onset     Cancer Brother         x 2     Cancer Mother         throat     Depression Sister      Depression Father        Social History:  Marital Status:   [4]  Social History     Tobacco Use     Smoking status: Former Smoker     Types: Cigarettes     Last attempt to quit: 10/13/2012     Years since quittin.6     Smokeless tobacco: Never Used   Substance Use Topics     Alcohol use: No     Alcohol/week: 0.0 oz     Drug use: No        Medications:      amoxicillin-clavulanate (AUGMENTIN) 875-125 MG tablet   amphetamine-dextroamphetamine (ADDERALL) 30 MG tablet   DIPHENHYDRAMINE HCL PO   Lurasidone HCl (LATUDA PO)   Solifenacin Succinate (VESICARE PO)   VITAMIN D, CHOLECALCIFEROL, PO         Review of Systems  "  Constitutional: Negative for chills, fatigue and fever.   Musculoskeletal:        Left wrist pain, redness, and swelling.   Skin: Positive for color change and wound.       Physical Exam   BP: 139/72  Heart Rate: 75  Temp: 97.6  F (36.4  C)  Resp: 20  Height: 167.6 cm (5' 6\")  Weight: 70.3 kg (155 lb)  SpO2: 98 %      Physical Exam   Constitutional: She is oriented to person, place, and time. She appears well-developed and well-nourished.   Cardiovascular: Normal rate and regular rhythm.   Musculoskeletal:   She has moderate erythema and swelling along the dorsal aspect of the left wrist.  There are 2 puncture wounds of unknown etiology of whether a bite or a scratch.  She also has 2 puncture wounds on the anatomic snuffbox of the left wrist as well.  No significant pain upon passive range of motion of the thumb.  She has full flexion extension of the digits.  She does have significant increasing pain upon passive and active extension of the third digit.  No lymphangitis.   Neurological: She is alert and oriented to person, place, and time.   Skin: Skin is warm and dry. Capillary refill takes less than 2 seconds.   Psychiatric: She has a normal mood and affect.   Nursing note and vitals reviewed.      ED Course        Procedures               Critical Care time:  none               Results for orders placed or performed during the hospital encounter of 05/27/19 (from the past 24 hour(s))   CBC with platelets differential   Result Value Ref Range    WBC 7.8 4.0 - 11.0 10e9/L    RBC Count 4.29 3.8 - 5.2 10e12/L    Hemoglobin 13.5 11.7 - 15.7 g/dL    Hematocrit 40.9 35.0 - 47.0 %    MCV 95 78 - 100 fl    MCH 31.5 26.5 - 33.0 pg    MCHC 33.0 31.5 - 36.5 g/dL    RDW 13.0 10.0 - 15.0 %    Platelet Count 309 150 - 450 10e9/L    Diff Method Automated Method     % Neutrophils 66.9 %    % Lymphocytes 22.0 %    % Monocytes 8.6 %    % Eosinophils 1.4 %    % Basophils 0.8 %    % Immature Granulocytes 0.3 %    Nucleated RBCs 0 " 0 /100    Absolute Neutrophil 5.2 1.6 - 8.3 10e9/L    Absolute Lymphocytes 1.7 0.8 - 5.3 10e9/L    Absolute Monocytes 0.7 0.0 - 1.3 10e9/L    Absolute Eosinophils 0.1 0.0 - 0.7 10e9/L    Absolute Basophils 0.1 0.0 - 0.2 10e9/L    Abs Immature Granulocytes 0.0 0 - 0.4 10e9/L    Absolute Nucleated RBC 0.0    Basic metabolic panel   Result Value Ref Range    Sodium 140 133 - 144 mmol/L    Potassium 4.1 3.4 - 5.3 mmol/L    Chloride 105 94 - 109 mmol/L    Carbon Dioxide 30 20 - 32 mmol/L    Anion Gap 5 3 - 14 mmol/L    Glucose 108 (H) 70 - 99 mg/dL    Urea Nitrogen 10 7 - 30 mg/dL    Creatinine 0.86 0.52 - 1.04 mg/dL    GFR Estimate 66 >60 mL/min/[1.73_m2]    GFR Estimate If Black 77 >60 mL/min/[1.73_m2]    Calcium 8.9 8.5 - 10.1 mg/dL       Medications   ampicillin-sulbactam sodium 3 (2-1) g 3 g in sodium chloride 0.9 % ( Intravenous Stopped 5/27/19 1201)       Assessments & Plan (with Medical Decision Making)   This patient exhibits no fevers, chills, or tachycardia.  She does have an animal bite to the left wrist with pain upon extension of the third digit.  Obviously this would be questionable for infectious tenosynovitis.  Being as early in the course and on the extensor surface I believe it certainly reasonable to trial antibiotics and close follow-up.  She was treated with IV Unasyn and home on oral Augmentin.  Close follow-up tomorrow in the clinic for recheck.  Return here for any worsening symptoms, new symptoms, or other concerns.  Splint was also placed for comfort.      I have reviewed the nursing notes.    I have reviewed the findings, diagnosis, plan and need for follow up with the patient.          Medication List      Started    amoxicillin-clavulanate 875-125 MG tablet  Commonly known as:  AUGMENTIN  1 tablet, Oral, 2 TIMES DAILY            Final diagnoses:   Cat bite of forearm, initial encounter       5/27/2019   [facility]     Preethi Bruce PA-C  05/27/19 [id number]

## 2019-05-27 NOTE — ED AVS SNAPSHOT
[facility]  [address]  [place] [address]  Phone:  [phone]                                    Alycia France   MRN: [id number]    Department:  [facility]   Date of Visit:  5/27/2019           After Visit Summary Signature Page    I have received my discharge instructions, and my questions have been answered. I have discussed any challenges I see with this plan with the nurse or doctor.    ..........................................................................................................................................  Patient/Patient Representative Signature      ..........................................................................................................................................  Patient Representative Print Name and Relationship to Patient    ..................................................               ................................................  Date                                   Time    ..........................................................................................................................................  Reviewed by Signature/Title    ...................................................              ..............................................  Date                                               Time          22EPIC Rev 08/18

## 2019-05-27 NOTE — ED TRIAGE NOTES
C/o cat bite to top of left hand yesterday. Pt states cat is owned by her and is UTD on all shots.

## 2019-05-27 NOTE — DISCHARGE INSTRUCTIONS
Please start Augmentin tonight with supper.     Please follow-up with Dr. Foote or anyone at [facility] tomorrow for wound recheck.     Splint for comfort.

## 2019-06-02 LAB
BACTERIA SPEC CULT: NORMAL
BACTERIA SPEC CULT: NORMAL
SPECIMEN SOURCE: NORMAL
SPECIMEN SOURCE: NORMAL

## 2019-09-13 PROBLEM — R45.851 SUICIDAL IDEATION: Status: ACTIVE | Noted: 2019-01-01

## 2019-09-13 NOTE — ED NOTES
Behavioral Intake called and they will be going to [address] Block. Report can be called anytime.

## 2019-09-13 NOTE — ED PROVIDER NOTES
"  History     Chief Complaint   Patient presents with     Depression     Anxiety     HPI  Alycia France is a 74 year old female who presents to the emergency room with depression and anxiety.  She states that over the past 2 weeks is gotten worse, but a week ago she was having relationship issues with her significant other and they broke up.  She is also had some issues with 1 of her cats being underweight and she is been working to make him grow.  She has thoughts about wishing she was going to die but she states she does not have a plan and would not kill herself because she is a \"T[employer]ism\".  In the past she has been on an antidepressant and lorazepam, she is not taking either of those at this time and she states that the medications really did not do anything when she was taking them.  She states she is generally healthy and feels well physically.    Allergies:  No Known Allergies    Problem List:    Patient Active Problem List    Diagnosis Date Noted     ACP (advance care planning) 2016     Priority: Medium     Patient refused information       Oral lesion 2013     Priority: Medium        Past Medical History:    Past Medical History:   Diagnosis Date     ADD (attention deficit disorder)      Anxiety      COPD (chronic obstructive pulmonary disease) (H)      Depression      Tardive dyskinesia        Past Surgical History:    Past Surgical History:   Procedure Laterality Date     APPENDECTOMY       CHOLECYSTECTOMY       SIGMOIDOSCOPY RIGID       thyroid resection, benign tumor         Family History:    Family History   Problem Relation Age of Onset     Cancer Brother         x 2     Cancer Mother         throat     Depression Sister      Depression Father        Social History:  Marital Status:   [4]  Social History     Tobacco Use     Smoking status: Current Some Day Smoker     Types: Cigarettes     Last attempt to quit: 10/13/2012     Years since quittin.9     Smokeless tobacco: Never " Used   Substance Use Topics     Alcohol use: No     Alcohol/week: 0.0 oz     Drug use: No        Medications:      DIPHENHYDRAMINE HCL PO   eszopiclone (LUNESTA) 1 MG tablet   gabapentin (NEURONTIN) 100 MG capsule   Solifenacin Succinate (VESICARE PO)   valbenazine (INGREZZA) 40 MG capsule   VITAMIN D, CHOLECALCIFEROL, PO   amphetamine-dextroamphetamine (ADDERALL) 30 MG tablet         Review of Systems   Constitutional: Positive for activity change. Negative for fatigue and fever.   HENT: Negative.    Respiratory: Negative for shortness of breath.    Cardiovascular: Negative for chest pain and palpitations.   Gastrointestinal: Negative for abdominal pain, constipation, diarrhea, nausea and vomiting.   Genitourinary: Negative for dysuria.   Musculoskeletal: Negative for back pain and neck pain.   Neurological: Negative for weakness and light-headedness.   Psychiatric/Behavioral: Positive for dysphoric mood and suicidal ideas. The patient is nervous/anxious.         Describes anhedonia, has poor eye contact, slowed speech, difficulty concentrating.       Physical Exam   BP: 140/66  Heart Rate: 83  Temp: 96.7  F (35.9  C)  Resp: 16  SpO2: 98 %      Physical Exam   Constitutional: She is oriented to person, place, and time. She appears well-developed and well-nourished.   HENT:   Head: Normocephalic and atraumatic.   Eyes: Pupils are equal, round, and reactive to light. EOM are normal.   Neck: Normal range of motion. Neck supple.   Cardiovascular: Normal rate, regular rhythm and normal heart sounds.   No murmur heard.  Pulmonary/Chest: Effort normal and breath sounds normal. No respiratory distress.   Abdominal: Soft. Bowel sounds are normal. She exhibits no distension. There is no tenderness.   Musculoskeletal: Normal range of motion. She exhibits no edema.   Neurological: She is alert and oriented to person, place, and time.   Skin: Skin is warm and dry. Capillary refill takes less than 2 seconds.   Psychiatric: Her  mood appears anxious. Her speech is delayed. She is slowed. Cognition and memory are normal. She expresses inappropriate judgment. She exhibits a depressed mood. She expresses suicidal ideation. She expresses no suicidal plans.       ED Course        Procedures    Results for orders placed or performed during the hospital encounter of 09/13/19 (from the past 24 hour(s))   Urine Drugs of Abuse Screen Panel 13   Result Value Ref [facility]    Cannabinoids (08-[id number]-2-[id number]-9-THC) Not Detected NDET^Not Detected ng/mL    Phencyclidine (Phencyclidine) Not Detected NDET^Not Detected ng/mL    Cocaine (Benzoylecgonine) Not Detected NDET^Not Detected ng/mL    Methamphetamine (d-Methamphetamine) Not Detected NDET^Not Detected ng/mL    Opiates (Morphine) Not Detected NDET^Not Detected ng/mL    Amphetamine (d-Amphetamine) Not Detected NDET^Not Detected ng/mL    Benzodiazepines (Nordiazepam) Not Detected NDET^Not Detected ng/mL    Tricyclic Antidepressants (Desipramine) Not Detected NDET^Not Detected ng/mL    Methadone (Methadone) Not Detected NDET^Not Detected ng/mL    Barbiturates (Butalbital) Not Detected NDET^Not Detected ng/mL    Oxycodone (Oxycodone) Not Detected NDET^Not Detected ng/mL    Propoxyphene (Norpropoxyphene) Not Detected NDET^Not Detected ng/mL    Buprenorphine (Buprenorphine) Not Detected NDET^Not Detected ng/mL   UA reflex to Microscopic and Culture   Result Value Ref [facility]    Color Urine Yellow     Appearance Urine Slightly Cloudy     Glucose Urine Negative NEG^Negative mg/dL    Bilirubin Urine Negative NEG^Negative    Ketones Urine Negative NEG^Negative mg/dL    Specific Gravity Urine 1.026 1.003 - 1.035    Blood Urine Negative NEG^Negative    pH Urine 6.0 4.7 - 8.0 pH    Protein Albumin Urine Negative NEG^Negative mg/dL    Urobilinogen mg/dL Normal 0.0 - 2.0 mg/dL    Nitrite Urine Negative NEG^Negative    Leukocyte Esterase Urine Trace (A) NEG^Negative    Source Unspecified Urine     RBC Urine 2 0 - 2 /HPF     WBC Urine 3 0 - 5 /HPF    Bacteria Urine Few (A) NEG^Negative /HPF    Squamous Epithelial /HPF Urine 40 (H) 0 - 1 /HPF    Mucous Urine Present (A) NEG^Negative /LPF   Alcohol ethyl   Result Value Ref [facility]    Ethanol g/dL <0.01 0.01 g/dL   CBC with platelets differential   Result Value Ref [facility]    WBC 6.2 4.0 - 11.0 10e9/L    RBC Count 4.58 3.8 - 5.2 10e12/L    Hemoglobin 14.2 11.7 - 15.7 g/dL    Hematocrit 43.8 35.0 - 47.0 %    MCV 96 78 - 100 fl    MCH 31.0 26.5 - 33.0 pg    MCHC 32.4 31.5 - 36.5 g/dL    RDW 12.9 10.0 - 15.0 %    Platelet Count 321 150 - 450 10e9/L    Diff Method Automated Method     % Neutrophils 51.4 %    % Lymphocytes 36.9 %    % Monocytes 8.0 %    % Eosinophils 2.4 %    % Basophils 1.1 %    % Immature Granulocytes 0.2 %    Nucleated RBCs 0 0 /100    Absolute Neutrophil 3.2 1.6 - 8.3 10e9/L    Absolute Lymphocytes 2.3 0.8 - 5.3 10e9/L    Absolute Monocytes 0.5 0.0 - 1.3 10e9/L    Absolute Eosinophils 0.2 0.0 - 0.7 10e9/L    Absolute Basophils 0.1 0.0 - 0.2 10e9/L    Abs Immature Granulocytes 0.0 0 - 0.4 10e9/L    Absolute Nucleated RBC 0.0    Comprehensive metabolic panel   Result Value Ref [facility]    Sodium 142 133 - 144 mmol/L    Potassium 4.2 3.4 - 5.3 mmol/L    Chloride 108 94 - 109 mmol/L    Carbon Dioxide 29 20 - 32 mmol/L    Anion Gap 5 3 - 14 mmol/L    Glucose 99 70 - 99 mg/dL    Urea Nitrogen 12 7 - 30 mg/dL    Creatinine 1.01 0.52 - 1.04 mg/dL    GFR Estimate 54 (L) >60 mL/min/[1.73_m2]    GFR Estimate If Black 63 >60 mL/min/[1.73_m2]    Calcium 9.1 8.5 - 10.1 mg/dL    Bilirubin Total 0.3 0.2 - 1.3 mg/dL    Albumin 4.2 3.4 - 5.0 g/dL    Protein Total 7.9 6.8 - 8.8 g/dL    Alkaline Phosphatase 47 40 - 150 U/L    ALT 21 0 - 50 U/L    AST 12 0 - 45 U/L       Medications - No data to display    Assessments & Plan (with Medical Decision Making)   DEC assessment performed, they recommend admission for medication and therapy.  She is accepted to  [facility] for admission, Dr. Marlo Block admitting  physician.    I have reviewed the nursing notes.    I have reviewed the findings, diagnosis, plan and need for follow up with the patient.  New Prescriptions    No medications on file       Final diagnoses:   Severe episode of recurrent major depressive disorder, without psychotic features (H)   Suicidal ideation       9/13/2019   [facility]     Anais Sahu MD  09/13/19 2066

## 2019-09-13 NOTE — TELEPHONE ENCOUNTER
S:  Pt seen in the  [facility] due to increased depression and SI.    B:  Info per Bruce KRUEGER  and  :  Pt has a hx of depression.  She reports she has been getting more depressed, especially so the past 2 weeks.  She reports she is wishing she could die and having SI but denies a plan or intent.  She is not sleeping or eating well.  She is hopeless and has lost interest in things.  Pt reports low energy and poor motivation.  She is unable to take care of things or participate in activities.  Pt reports a recent relationship break up.  Pt lives alone.  She states she was hospitalized about 20 yrs ago.   She is seeing a psychiatrist but can't get in until the end of the month.  She is set up to start seeing a therapist next week. She is currently on a med for sleep and one for anxiety.  She states she has been on an antidepressant before and was on Adderall, but not currently.  Medically cleared.  Please see chart for further information.    A:  Needing hospitalization for safety and stabilization.    R:  Admit to [facility]     /  Accepted by Maria Del Carmen Yost  [facility]  Cayedn informed,   12:55  -  ED, Analisa, informed 1300

## 2019-09-13 NOTE — ED TRIAGE NOTES
"Patient presents with complaints of depression and anxiety.  States over the past two weeks it has gotten worse.  States she sees Dr. Barton in [place]; but can't get into to see him again until the end of the month.  Patient states she does have thoughts about wishing she could die, but states she doesn't have a plan and would never actually kill herself as she is a \"[employer]ist\" states she was on an anit depressant and lorazepam but is not currently taking them \"they didn't help anyway\"  "

## 2019-09-13 NOTE — PLAN OF CARE
Report received from TOMMY Diana in our ED.      ADMISSION NOTE    Reason for admission Suicidal Ideation.  Safety concerns thoughts of Suicide contracts for safety at this time.  Risk for or history of violence none.   Full skin assessment: no areas of concern    Patient arrived on unit from [facility] Emergency Room accompanied by Security, and unit assistant on 9/13/2019  2:38 PM.   Status on arrival: cooperative, anxious, pleasant.   /68   Temp 98.7  F (37.1  C) (Oral)   Resp 16   SpO2 98%   Patient given tour of unit and Welcome to  unit papers given to patient, wanding completed, belongings inventoried, and admission assessment completed.   Patient's legal status on arrival is Voluntary. Appropriate legal rights discussed with and copy given to patient. Patient Bill of Rights discussed with and copy given to patient.   Patient denies SI, HI, and thoughts of self harm and contracts for safety while on unit.      Olena Ruth RN  9/13/2019  3:31 PM      Patient arrives to unit, she states she brought herself into the ED due to thoughts of suicide however, she does not believe in suicide due to her Druze. She does have an appointment scheduled for Tuesday for [facility] in [place], she does report she has been going to the [facility] for a while. Patient states she now knows she needs to medications and believes they would be helpful now. Pt reports anxiety and depression 5/10 she does contract for safety.

## 2019-09-14 NOTE — PLAN OF CARE
Patient slept through the night.  Regular respirations and position changes noted. Face to face end of shift report communicated to day shift RN.     Caryn Meza RN  9/14/2019  6:15 AM

## 2019-09-14 NOTE — PROGRESS NOTES
09/13/19 2138   Patient Belongings   Did you bring any home meds/supplements to the hospital?  No   Patient Belongings remains with patient;locker;sent to security per site process   Patient Belongings Remaining with Patient clothing;glasses   Patient Belongings Put in Hospital Secure Location (Security or Locker, etc.) cash/credit card;cell phone/electronics;clothing;money (see comment);keys;glasses;purse/wallet;shoes;watch;other (see comments)   Belongings Search Yes   Clothing Search Yes   Second Staff Bernarda   Comment White bra, white underwear, and prescription glasses ( remains with patient ). blue velvet jacket, red jacket, socks with cats on them, blue turtle neck long sleeve, blue jeans, brown ankle boots, 3 pairs of white underwear, brown purse, tire gauge, random reciepts, makeup powder, blue hair brush, blush makeup, mail, half bottle of shampoo, black hair brush, samsung , half tube of conditioner, comb, the glass lake book, eager book, 1 full pack of marb reds, open pack of marb reds, brown sunglasses, black glasses case, 3 lighters, lip balm, pen, 4 vape pens, green glasses case, opened tic tacs, black flash light, tin full of cig butts, toothpicks, listerine breathe strips, water thermos, random papers, red coin purse.   List items sent to safe: 5 medicare cards, [place] drivers license, payday card,library card, [employer] club card, [facility] visa card, [facility] visa card, [facility] FLORY card, car insurance info, green and brown watch, key chain with 1 key, 1 loose key, green key chain with 3 keys, key chain with 1 key fob and 6 keys, key chain with 1 key fob and 4 keys, wizard earrings, [employer] S10 phone (no cracks), checks # [id number] ( none missing), checks # [id number] ( none missing), * Cash 23-20$ bills, 1-10$ bill, 22-1$ bills* total cash - $492.75  All other belongings put in assigned cubby in belongings room.     9/13/19 patient came in with advil and impreza. Given to the nurse.   I  have reviewed my belongings list on admission and verify that it is correct.     Patient signature_______________________________    Second staff witness (if patient unable to sign) ______________________________       I have received all my belongings at discharge.    Patient signature________________________________    Kimberly   9/13/2019  9:54 PM

## 2019-09-14 NOTE — PLAN OF CARE
Pt attends dayroom for breakfast and watch TV but does not interact with peers. Cooperative and pleasant with writer. Expressed feelings of anxiety d/t making changes in living status and worries if she can have 3 cats in an apartment. Did say a family member is assisting her with this. Requested and received Atarax 50 mg po c/o anxiety at 0832. Denies discomfort. Gait steady. Pt safe - contracts for safety, denies suicidal thoughts at present  0930- pt sleeping  1010- pt awake and feeling better.

## 2019-09-14 NOTE — PROGRESS NOTES
JOAQUÍN MOSS.  Patient requested visit with  who she knows. This  has had a history with this patient in /[employer] member role for nearly 8 years.  Joaquín has had a continuing history of depression which has been greater or lesser at times. She has noted SI but has not acted in any way upon this. She is anxious to move into town and knows she needs to be have more social interaction. We discussed this at length and some possible locations to move into. Closed our time together in prayer.     Face to face end of shift report communicated to evening TOMMY Pierre RN  9/14/2019  3:10 PM

## 2019-09-14 NOTE — H&P
Psychiatric Eval/H&P  Patient Name: Alycia France   YOB: 1945  Age: 74 year old  [id number]    Primary Physician: Adalgisa Foote   Completed By: MARI Reese CNP     CC:  Suicidal Ideation    - Info per Bruce KRUEGER  and  :  Pt has a hx of depression.  She reports she has been getting more depressed, especially so the past 2 weeks.  She reports she is wishing she could die and having SI but denies a plan or intent.  She is not sleeping or eating well.  She is hopeless and has lost interest in things.  Pt reports low energy and poor motivation.  She is unable to take care of things or participate in activities.  Pt reports a recent relationship break up.  Pt lives alone.  She states she was hospitalized about 20 yrs ago.   She is seeing a psychiatrist but can't get in until the end of the month.  She is set up to start seeing a therapist next week. She is currently on a med for sleep and one for anxiety.  She states she has been on an antidepressant before and was on Adderall, but not currently.  Medically cleared.  Please see chart for further information.    HPI  Patient reports depression and anxiety has worsened past couple weeks.  She did sleep good last night taking Atarax and feels a bit better today.  She endorses passive suicidal thoughts, hopelessness, loss of energy and interest - although denies ever having actual suicidal plan or intent.  Patient also tells me that today she is realizing that she really needs to move into town, either to buy a different house or get an apartment.  She reports her sister lives in [place] and she has a niece who will help her navigate this move.  Patient informs me she would like to get an antidepressant and that she had genetic testing done.  She has had many trials, states Prozac worked in the past, however she does not know if this is listed as the most effective or not.  She reports that through the trials, some  "worked and some didn't and does not remember which ones.  She denies hallucination, and denies outright suicidal thoughts.    Past Psychiatric History:  Patient has long history of depression and anxiety, records indicate one other inpatient hospitalization about 20 years ago for deep depressive symptoms, she has no history of suicide attempts.  She sees psychiatry at [facility] and has not taken any medications for mental health for the past couple months.  Patient has DNA testing with Dr. Mata from [facility] and will obtain records as soon as possible, this is the weekend.  Patient sees Adalgisa Garcia at [facility].  She had Sleep Study done recently, 7/23/19 resulting in diagnosis of Sleep Apnea and Cpap recommended.    Past medication trials:  Prozac, Effexor, Wellbutrin, Latuda, Remeron, Zoloft, Paxil, Pristiq, buspar, trazodone, Zolpidem, Valium, and Fetzima.     Social History:   Patient  20yrs ago, no children and recent break-up with significant other.  [place] and she feels like she needs to move into town.  Patient has sister who lives in [place] and \"a bunch\" of nieces and nephews, she has one niece in particular who will help her find a place and move.       Chemical Use History:   Patient denies use of alcohol or illicit drugs, she has no history of use.       Family Psychiatric History:   Patient reports anxiety in the family            Medical Review of Systems:   Medical History and ROS  Prior to Admission medications    Medication Sig Start Date End Date Taking? Authorizing Provider   eszopiclone (LUNESTA) 3 MG tablet Take 3 mg by mouth At Bedtime   Yes Reported, Patient   gabapentin (NEURONTIN) 100 MG capsule Take 100 mg by mouth At Bedtime   Yes Reported, Patient   LORazepam (ATIVAN) 1 MG tablet Take 1 mg by mouth daily as needed for anxiety   Yes Reported, Patient   solifenacin (VESICARE) 10 MG tablet Take 10 mg by mouth daily   Yes " Reported, Patient   valbenazine (INGREZZA) 40 MG capsule Take 40 mg by mouth daily   Yes Reported, Patient   amphetamine-dextroamphetamine (ADDERALL) 20 MG tablet Take 40 mg by mouth daily    Reported, Patient     No Known Allergies  Past Medical History:   Diagnosis Date     ADD (attention deficit disorder)      Anxiety      COPD (chronic obstructive pulmonary disease) (H)      Depression      Tardive dyskinesia      Past Surgical History:   Procedure Laterality Date     APPENDECTOMY       CHOLECYSTECTOMY       SIGMOIDOSCOPY RIGID       thyroid resection, benign tumor        Physical Exam - per Anais Becker MD 9/13/19  No changes noted    Review of Systems   Constitutional: Positive for activity change. Negative for fatigue and fever.   HENT: Negative.    Respiratory: Negative for shortness of breath.    Cardiovascular: Negative for chest pain and palpitations.   Gastrointestinal: Negative for abdominal pain, constipation, diarrhea, nausea and vomiting.   Genitourinary: Negative for dysuria.   Musculoskeletal: Negative for back pain and neck pain.   Neurological: Negative for weakness and light-headedness.   Psychiatric/Behavioral: Positive for dysphoric mood and suicidal ideas. The patient is nervous/anxious.         Describes anhedonia, has poor eye contact, slowed speech, difficulty concentrating.         Physical Exam   BP: 140/66  Heart Rate: 83  Temp: 96.7  F (35.9  C)  Resp: 16  SpO2: 98 %        Physical Exam   Constitutional: She is oriented to person, place, and time. She appears well-developed and well-nourished.   HENT:   Head: Normocephalic and atraumatic.   Eyes: Pupils are equal, round, and reactive to light. EOM are normal.   Neck: Normal range of motion. Neck supple.   Cardiovascular: Normal rate, regular rhythm and normal heart sounds.   No murmur heard.  Pulmonary/Chest: Effort normal and breath sounds normal. No respiratory distress.   Abdominal: Soft. Bowel sounds are normal. She  exhibits no distension. There is no tenderness.   Musculoskeletal: Normal range of motion. She exhibits no edema.   Neurological: She is alert and oriented to person, place, and time.   Skin: Skin is warm and dry. Capillary refill takes less than 2 seconds.   Psychiatric: Her mood appears anxious. Her speech is delayed. She is slowed. Cognition and memory are normal. She expresses inappropriate judgment. She exhibits a depressed mood. She expresses suicidal ideation. She expresses no suicidal plans.         MSE/PSYCH  PSYCHIATRIC EXAM  /70   Pulse 80   Temp 97.2  F (36.2  C) (Tympanic)   Resp 16   SpO2 97%      -Appearance/Behavior:  Casually groomed  {attitude:pleasant, cooperative and anxious  -Motor: normal or unremarkable.  -Gait: Normal.    -Abnormal involuntary movements: None noted  -Mood: depressed and anxious.  -Affect: Appropriate/mood-congruent.  -Speech: Normal                  -Thought process/associations: Logical and Linear.  -Thought content: No evidence of delusion, normal   -Perceptual disturbances: No hallucinations..              -Suicidal/Homicidal Ideation: Passive thoughts, denies plan or intent  -Judgment: Good.  -Insight: Adequate.  *Orientation: time, place and person.  *Memory: Intact  *Attention: Adequate for interview  *Language: fluent, no aphasias, able to repeat phrases and name objects. Vocab intact.  *Fund of information: appropriate for education  *Cognitive functioning estimate: 0 - independent.     Labs:  Results for orders placed or performed during the hospital encounter of 09/13/19   Urine Drugs of Abuse Screen Panel 13   Result Value Ref Range    Cannabinoids (28-tem-4-carboxy-9-THC) Not Detected NDET^Not Detected ng/mL    Phencyclidine (Phencyclidine) Not Detected NDET^Not Detected ng/mL    Cocaine (Benzoylecgonine) Not Detected NDET^Not Detected ng/mL    Methamphetamine (d-Methamphetamine) Not Detected NDET^Not Detected ng/mL    Opiates (Morphine) Not Detected  NDET^Not Detected ng/mL    Amphetamine (d-Amphetamine) Not Detected NDET^Not Detected ng/mL    Benzodiazepines (Nordiazepam) Not Detected NDET^Not Detected ng/mL    Tricyclic Antidepressants (Desipramine) Not Detected NDET^Not Detected ng/mL    Methadone (Methadone) Not Detected NDET^Not Detected ng/mL    Barbiturates (Butalbital) Not Detected NDET^Not Detected ng/mL    Oxycodone (Oxycodone) Not Detected NDET^Not Detected ng/mL    Propoxyphene (Norpropoxyphene) Not Detected NDET^Not Detected ng/mL    Buprenorphine (Buprenorphine) Not Detected NDET^Not Detected ng/mL   UA reflex to Microscopic and Culture   Result Value Ref Range    Color Urine Yellow     Appearance Urine Slightly Cloudy     Glucose Urine Negative NEG^Negative mg/dL    Bilirubin Urine Negative NEG^Negative    Ketones Urine Negative NEG^Negative mg/dL    Specific Gravity Urine 1.026 1.003 - 1.035    Blood Urine Negative NEG^Negative    pH Urine 6.0 4.7 - 8.0 pH    Protein Albumin Urine Negative NEG^Negative mg/dL    Urobilinogen mg/dL Normal 0.0 - 2.0 mg/dL    Nitrite Urine Negative NEG^Negative    Leukocyte Esterase Urine Trace (A) NEG^Negative    Source Unspecified Urine     RBC Urine 2 0 - 2 /HPF    WBC Urine 3 0 - 5 /HPF    Bacteria Urine Few (A) NEG^Negative /HPF    Squamous Epithelial /HPF Urine 40 (H) 0 - 1 /HPF    Mucous Urine Present (A) NEG^Negative /LPF   Alcohol ethyl   Result Value Ref Range    Ethanol g/dL <0.01 0.01 g/dL   CBC with platelets differential   Result Value Ref Range    WBC 6.2 4.0 - 11.0 10e9/L    RBC Count 4.58 3.8 - 5.2 10e12/L    Hemoglobin 14.2 11.7 - 15.7 g/dL    Hematocrit 43.8 35.0 - 47.0 %    MCV 96 78 - 100 fl    MCH 31.0 26.5 - 33.0 pg    MCHC 32.4 31.5 - 36.5 g/dL    RDW 12.9 10.0 - 15.0 %    Platelet Count 321 150 - 450 10e9/L    Diff Method Automated Method     % Neutrophils 51.4 %    % Lymphocytes 36.9 %    % Monocytes 8.0 %    % Eosinophils 2.4 %    % Basophils 1.1 %    % Immature Granulocytes 0.2 %     "Nucleated RBCs 0 0 /100    Absolute Neutrophil 3.2 1.6 - 8.3 10e9/L    Absolute Lymphocytes 2.3 0.8 - 5.3 10e9/L    Absolute Monocytes 0.5 0.0 - 1.3 10e9/L    Absolute Eosinophils 0.2 0.0 - 0.7 10e9/L    Absolute Basophils 0.1 0.0 - 0.2 10e9/L    Abs Immature Granulocytes 0.0 0 - 0.4 10e9/L    Absolute Nucleated RBC 0.0    Comprehensive metabolic panel   Result Value Ref Range    Sodium 142 133 - 144 mmol/L    Potassium 4.2 3.4 - 5.3 mmol/L    Chloride 108 94 - 109 mmol/L    Carbon Dioxide 29 20 - 32 mmol/L    Anion Gap 5 3 - 14 mmol/L    Glucose 99 70 - 99 mg/dL    Urea Nitrogen 12 7 - 30 mg/dL    Creatinine 1.01 0.52 - 1.04 mg/dL    GFR Estimate 54 (L) >60 mL/min/[1.73_m2]    GFR Estimate If Black 63 >60 mL/min/[1.73_m2]    Calcium 9.1 8.5 - 10.1 mg/dL    Bilirubin Total 0.3 0.2 - 1.3 mg/dL    Albumin 4.2 3.4 - 5.0 g/dL    Protein Total 7.9 6.8 - 8.8 g/dL    Alkaline Phosphatase 47 40 - 150 U/L    ALT 21 0 - 50 U/L    AST 12 0 - 45 U/L          Assessment/Impression:   Patient presents with worsening depression and anxiety for past two weeks, with long history of symptoms which she states \"comes and goes\".  She is struggling currently [place] want [place], and states today she knows now that she needs to move into town, feeling too isolative.  Patient had genetic testing done and would like to wait until we have record of this before initiating antidepressant - as she has had many trials.  She would like to focus on anxiety today, she felt Atarax helped with sleep last night and anxiety this morning.      Educated regarding medication indications, risks, benefits, side effects, contraindications and possible interactions. Verbally expressed understanding.     DX:  Major Depressive Disorder, recurrent, severe without psychosis  Generalized Anxiety Disorder      Plan:  Admit to Unit: [address]    Monitor for target symptoms:   Provide a safe environment and therapeutic " milieu.     Utilize Atarax prn for sleep and anxiety  Initiate an antidepressant when genetic testing results are available    Anticipated length of stay: 2-3 days for stabilization       Maria Del Carmen Edwards, APRN, CNP

## 2019-09-14 NOTE — PLAN OF CARE
Problem: Depression  Goal: Improved Mood  Outcome: No Change   Patient reports that she is still depressed and having suicidal thoughts.  She states that she would never attempt or hurt herself and feels she will be safe while on the unit.   Problem: Adult Behavioral Health Plan of Care  Goal: Patient-Specific Goal (Individualization)  Outcome: No Change  Note:     Patient has been calm, cooperative, and medication compliant this shift.  She was out in the lounge often and social with peers but did not attend groups.  She was very concerned about getting her medications restarted and is worried she will be unable to sleep without her Lunesta.  Patient complained of high anxiety and received 50 mg hydroxyzine at 1549 with minimal relief of symptoms.  She did also take 1 mg Ativan at 2100.  Patient appears to have been asleep by. Patient awake in room as of 2300. No complaints of pain.  VS WNL. Care continued to next shift.

## 2019-09-15 NOTE — DISCHARGE SUMMARY
[facility]    Discharge Summary  Adult Psychiatry    Date of Admission:  9/13/2019  Date of Discharge:  9/15/2019  Discharging Provider: Maria Del Carmen Edwards    Discharge Diagnoses   Principal Discharge Diagnosis: Major Depressive Disorder, moderate, recurrent  Secondary Discharge Diagnosis: Generalized Anxiety Disorder, Chronic  Medical Diagnoses addressed this admission: None    History of Present Illness    Info per Bruce KRUEGER  and  :  Pt has a hx of depression.  She reports she has been getting more depressed, especially so the past 2 weeks.  She reports she is wishing she could die and having SI but denies a plan or intent.  She is not sleeping or eating well.  She is hopeless and has lost interest in things.  Pt reports low energy and poor motivation.  She is unable to take care of things or participate in activities.  Pt reports a recent relationship break up.  Pt lives alone.  She states she was hospitalized about 20 yrs ago.   She is seeing a psychiatrist but can't get in until the end of the month.  She is set up to start seeing a therapist next week. She is currently on a med for sleep and one for anxiety.  She states she has been on an antidepressant before and was on Adderall, but not currently.  Medically cleared.  Please see chart for further information.    Upon admission to the unit, patient reports depression and anxiety has worsened past couple weeks.  She did sleep good last night taking Atarax and feels a bit better today.  She endorses passive suicidal thoughts, hopelessness, loss of energy and interest - although denies ever having actual suicidal plan or intent.  Patient also tells me that today she is realizing that she really needs to move into town, either to buy a different house or get an apartment.  She reports her sister lives in [place] and she has a niece who will help her navigate this move.  Patient informs me she would like to get an  "antidepressant and that she had genetic testing done.  She has had many trials, states Prozac worked in the past, however she does not know if this is listed as the most effective or not.  She reports that through the trials, some worked and some didn't and does not remember which ones.  She denies hallucination, and denies outright suicidal thoughts.     Past Psychiatric History:  Patient has long history of depression and anxiety, records indicate one other inpatient hospitalization about 20 years ago for deep depressive symptoms, she has no history of suicide attempts.  She sees psychiatry at [facility] and has not taken any medications for mental health for the past couple months.  Patient has DNA testing with Dr. Mata from [facility] and will obtain records as soon as possible, this is the weekend.  Patient sees Adalgisa Garcia at [facility].  She had Sleep Study done recently, 7/23/19 resulting in diagnosis of Sleep Apnea and Cpap recommended.     Past medication trials:  Prozac, Effexor, Wellbutrin, Latuda, Remeron, Zoloft, Paxil, Pristiq, buspar, trazodone, Zolpidem, Valium, and Fetzima.      Hospital Course   Patient reports feeling better today and wants to go home.  She continues to have \"a bit of anxiety\", although she is finding the hydroxyzine helpful and would like a prescription.  She does ask about getting Adderall, and it is recommended she either continue not to take and/or discuss with current prescriber, she is fine with this.  Otherwise, she has slept better and mood improved, she denies suicidal or homicidal thoughts and feels ready to start making plans to move. Patient is provided with additional resources such as [facility] and [facility].  She has already scheduled appointment set up with both her psychiatrist and therapist appointment this Tuesday.  We did not start an antidepressant while in hospital as genetic testing is not available to us over the weekend, she states she would " rather consult with Dr. Guillen at her next appointment.  At the time of discharge, there is no evidence this patient is in imminent danger of self harm or harming others and will discharge to her home.  30-day supply of hydroxyzine sent to local pharmacy.        MARI Reese CNP    Significant Results and Procedures   None    Pending Results   None  Unresulted Labs Ordered in the Past 30 Days of this Admission     No orders found from 8/14/2019 to 9/14/2019.          Code Status   Full Code    Primary Care Physician   Adalgisa Foote    Physical Exam   Appearance:  awake, alert  Attitude:  cooperative  Eye Contact:  good  Mood:  better  Affect:  appropriate and in normal range  Speech:  clear, coherent  Psychomotor Behavior:  no evidence of tardive dyskinesia, dystonia, or tics  Thought Process:  logical and linear  Associations:  no loose associations  Thought Content:  no evidence of suicidal ideation or homicidal ideation and no evidence of psychotic thought  Insight:  fair  Judgment:  intact  Oriented to:  time, person, and place  Attention Span and Concentration:  intact  Recent and Remote Memory:  intact  Language: Able to name objects, Able to repeat phrases and Able to read and write  Fund of Knowledge: appropriate  Muscle Strength and Tone: normal  Gait and Station: Normal    Time Spent on this Encounter   I, MARI Reese CNP, personally saw the patient today and spent less than or equal to 30 minutes discharging this patient.    Discharge Disposition   Discharged to home  Condition at discharge: Stable    Consultations This Hospital Stay   None    Discharge Orders   No discharge procedures on file.  Discharge Medications   Current Discharge Medication List      START taking these medications    Details   hydrOXYzine (ATARAX) 25 MG tablet Take 1 tablet (25 mg) by mouth 3 times daily as needed for anxiety  Qty: 90 tablet, Refills: 0    Associated Diagnoses: Generalized  anxiety disorder         CONTINUE these medications which have NOT CHANGED    Details   eszopiclone (LUNESTA) 3 MG tablet Take 3 mg by mouth At Bedtime      gabapentin (NEURONTIN) 100 MG capsule Take 100 mg by mouth At Bedtime      LORazepam (ATIVAN) 1 MG tablet Take 1 mg by mouth daily as needed for anxiety      solifenacin (VESICARE) 10 MG tablet Take 10 mg by mouth daily      valbenazine (INGREZZA) 40 MG capsule Take 40 mg by mouth daily      amphetamine-dextroamphetamine (ADDERALL) 20 MG tablet Take 40 mg by mouth daily           Allergies   No Known Allergies    LABS:  Results for orders placed or performed during the hospital encounter of 09/13/19   Urine Drugs of Abuse Screen Panel 13   Result Value Ref Range    Cannabinoids (40-dpa-1-carboxy-9-THC) Not Detected NDET^Not Detected ng/mL    Phencyclidine (Phencyclidine) Not Detected NDET^Not Detected ng/mL    Cocaine (Benzoylecgonine) Not Detected NDET^Not Detected ng/mL    Methamphetamine (d-Methamphetamine) Not Detected NDET^Not Detected ng/mL    Opiates (Morphine) Not Detected NDET^Not Detected ng/mL    Amphetamine (d-Amphetamine) Not Detected NDET^Not Detected ng/mL    Benzodiazepines (Nordiazepam) Not Detected NDET^Not Detected ng/mL    Tricyclic Antidepressants (Desipramine) Not Detected NDET^Not Detected ng/mL    Methadone (Methadone) Not Detected NDET^Not Detected ng/mL    Barbiturates (Butalbital) Not Detected NDET^Not Detected ng/mL    Oxycodone (Oxycodone) Not Detected NDET^Not Detected ng/mL    Propoxyphene (Norpropoxyphene) Not Detected NDET^Not Detected ng/mL    Buprenorphine (Buprenorphine) Not Detected NDET^Not Detected ng/mL   UA reflex to Microscopic and Culture   Result Value Ref Range    Color Urine Yellow     Appearance Urine Slightly Cloudy     Glucose Urine Negative NEG^Negative mg/dL    Bilirubin Urine Negative NEG^Negative    Ketones Urine Negative NEG^Negative mg/dL    Specific Gravity Urine 1.026 1.003 - 1.035    Blood Urine Negative  NEG^Negative    pH Urine 6.0 4.7 - 8.0 pH    Protein Albumin Urine Negative NEG^Negative mg/dL    Urobilinogen mg/dL Normal 0.0 - 2.0 mg/dL    Nitrite Urine Negative NEG^Negative    Leukocyte Esterase Urine Trace (A) NEG^Negative    Source Unspecified Urine     RBC Urine 2 0 - 2 /HPF    WBC Urine 3 0 - 5 /HPF    Bacteria Urine Few (A) NEG^Negative /HPF    Squamous Epithelial /HPF Urine 40 (H) 0 - 1 /HPF    Mucous Urine Present (A) NEG^Negative /LPF   Alcohol ethyl   Result Value Ref Range    Ethanol g/dL <0.01 0.01 g/dL   CBC with platelets differential   Result Value Ref Range    WBC 6.2 4.0 - 11.0 10e9/L    RBC Count 4.58 3.8 - 5.2 10e12/L    Hemoglobin 14.2 11.7 - 15.7 g/dL    Hematocrit 43.8 35.0 - 47.0 %    MCV 96 78 - 100 fl    MCH 31.0 26.5 - 33.0 pg    MCHC 32.4 31.5 - 36.5 g/dL    RDW 12.9 10.0 - 15.0 %    Platelet Count 321 150 - 450 10e9/L    Diff Method Automated Method     % Neutrophils 51.4 %    % Lymphocytes 36.9 %    % Monocytes 8.0 %    % Eosinophils 2.4 %    % Basophils 1.1 %    % Immature Granulocytes 0.2 %    Nucleated RBCs 0 0 /100    Absolute Neutrophil 3.2 1.6 - 8.3 10e9/L    Absolute Lymphocytes 2.3 0.8 - 5.3 10e9/L    Absolute Monocytes 0.5 0.0 - 1.3 10e9/L    Absolute Eosinophils 0.2 0.0 - 0.7 10e9/L    Absolute Basophils 0.1 0.0 - 0.2 10e9/L    Abs Immature Granulocytes 0.0 0 - 0.4 10e9/L    Absolute Nucleated RBC 0.0    Comprehensive metabolic panel   Result Value Ref Range    Sodium 142 133 - 144 mmol/L    Potassium 4.2 3.4 - 5.3 mmol/L    Chloride 108 94 - 109 mmol/L    Carbon Dioxide 29 20 - 32 mmol/L    Anion Gap 5 3 - 14 mmol/L    Glucose 99 70 - 99 mg/dL    Urea Nitrogen 12 7 - 30 mg/dL    Creatinine 1.01 0.52 - 1.04 mg/dL    GFR Estimate 54 (L) >60 mL/min/[1.73_m2]    GFR Estimate If Black 63 >60 mL/min/[1.73_m2]    Calcium 9.1 8.5 - 10.1 mg/dL    Bilirubin Total 0.3 0.2 - 1.3 mg/dL    Albumin 4.2 3.4 - 5.0 g/dL    Protein Total 7.9 6.8 - 8.8 g/dL    Alkaline Phosphatase 47 40 -  150 U/L    ALT 21 0 - 50 U/L    AST 12 0 - 45 U/L

## 2019-09-15 NOTE — PLAN OF CARE
Denies suicidal thoughts, depression or hallucinations. States moderate anxiety in which pt requested Atarax 50 mg po at 0836. Feels safe enough to go home , will talk to NP. Stated she has a good support group and many hobbies and activities to occupy her time. Denies discomfort.   0930- pt asleep  1241- discharge summary verbally reviewed with pt. Home medications given. Belongings given. Pt states understanding. D'cd to home accompanied by UA downstairs. Pt stable.  Discharge Note    Patient Discharged to home on 9/15/2019 1:09 PM via Private Car accompanied by UA.     Patient informed of discharge instructions in AVS. patient verbalizes understanding and denies having any questions pertaining to AVS. Patient stable at time of discharge. Patient denies SI, HI, and thoughts of self harm at time of discharge. All personal belongings returned to patient. Discharge prescriptions sent to [facility] via electronic communication. Psych evaluation, history and physical, AVS, and discharge summary faxed to next level of care.     Jennifer Pierre RN  9/15/2019  1:09 PM

## 2019-09-15 NOTE — PLAN OF CARE
Face to face end of shift report received from Caryn JAMISON RN. Rounding completed. Patient observed.     Keegan Jacome RN  9/14/2019  11:49 PM    Problem: Adult Behavioral Health Plan of Care  Goal: Patient-Specific Goal (Individualization)  9/14/2019 2349 by Keegan Jacome RN  Outcome: No Change  Note:     Pt has been in bed with eyes closed and regular respirations observed all night. Will continue to monitor.     Face to face end of shift report will be given to oncoming RN.     Keegan Jacome RN  9/15/2019  5:57 AM

## 2019-09-15 NOTE — PLAN OF CARE
Problem: Depression  Goal: Improved Mood  9/14/2019 2158 by Caryn Meza RN  Outcome: Improving   Patient states that she is still depressed but is feeling much better.  She denies having suicidal thoughts and states she feels she will be safe.       Problem: Adult Inpatient Plan of Care  Goal: Patient-Specific Goal (Individualization)  Description  Pt. Will:  1. Take medications as prescribed.  2. Attend at least 50% of group therapy daily.  3. Eat at least 50% of meals daily.  4. Cooperate with treatment team recommendations.  5. Shower and dress in clean scrubs daily, independently.   Outcome: Improving   Patient has been calm, cooperative, and medication compliant this shift. She did not attend groups but was social with peers and staff in [place] [facility]  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  She is hopeful for the future and has a bright affect.  She states she would like to discharge tomorrow and feels she is safe to return home.  Patient complained of anxiety and took 1 mg Ativan at 2103.  Still awake in bed as of 2200.  No complaints of pain.  VS WNL. Face to face end of shift report communicated to night shift RN.     Caryn Meza RN  9/14/2019  10:05 PM

## 2020-01-01 ENCOUNTER — APPOINTMENT (OUTPATIENT)
Dept: OCCUPATIONAL THERAPY | Facility: HOSPITAL | Age: 75
DRG: 885 | End: 2020-01-01
Payer: MEDICARE

## 2020-01-01 ENCOUNTER — TRANSFERRED RECORDS (OUTPATIENT)
Dept: HEALTH INFORMATION MANAGEMENT | Facility: CLINIC | Age: 75
End: 2020-01-01

## 2020-01-01 ENCOUNTER — TELEPHONE (OUTPATIENT)
Dept: BEHAVIORAL HEALTH | Facility: HOSPITAL | Age: 75
End: 2020-01-01

## 2020-01-01 ENCOUNTER — APPOINTMENT (OUTPATIENT)
Dept: OCCUPATIONAL THERAPY | Facility: HOSPITAL | Age: 75
DRG: 885 | End: 2020-01-01
Attending: NURSE PRACTITIONER
Payer: MEDICARE

## 2020-01-01 ENCOUNTER — HOSPITAL ENCOUNTER (INPATIENT)
Facility: HOSPITAL | Age: 75
LOS: 7 days | Discharge: HOME | DRG: 885 | End: 2020-06-29
Attending: EMERGENCY MEDICINE | Admitting: PSYCHIATRY & NEUROLOGY
Payer: MEDICARE

## 2020-01-01 VITALS
WEIGHT: 173.6 LBS | HEART RATE: 96 BPM | BODY MASS INDEX: 28.92 KG/M2 | DIASTOLIC BLOOD PRESSURE: 60 MMHG | OXYGEN SATURATION: 97 % | TEMPERATURE: 97 F | RESPIRATION RATE: 16 BRPM | SYSTOLIC BLOOD PRESSURE: 137 MMHG | HEIGHT: 65 IN

## 2020-01-01 DIAGNOSIS — G24.01 NEUROLEPTIC-INDUCED TARDIVE DYSKINESIA: ICD-10-CM

## 2020-01-01 DIAGNOSIS — T43.505A NEUROLEPTIC-INDUCED TARDIVE DYSKINESIA: ICD-10-CM
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
## 2020-01-01 DIAGNOSIS — F33.1 MAJOR DEPRESSIVE DISORDER, RECURRENT EPISODE, MODERATE (H): Primary | ICD-10-CM

## 2020-01-01 LAB
ALBUMIN SERPL-MCNC: 3.4 G/DL (ref 3.4–5)
ALBUMIN UR-MCNC: NEGATIVE MG/DL
ALP SERPL-CCNC: 41 U/L (ref 40–150)
ALT SERPL W P-5'-P-CCNC: 21 U/L (ref 0–50)
AMPHETAMINES UR QL: ABNORMAL NG/ML
ANION GAP SERPL CALCULATED.3IONS-SCNC: 4 MMOL/L (ref 3–14)
APAP SERPL-MCNC: <2 MG/L (ref 10–20)
APPEARANCE UR: CLEAR
AST SERPL W P-5'-P-CCNC: 15 U/L (ref 0–45)
BACTERIA #/AREA URNS HPF: ABNORMAL /HPF
BARBITURATES UR QL SCN: ABNORMAL NG/ML
BASOPHILS # BLD AUTO: 0.1 10E9/L (ref 0–0.2)
BASOPHILS NFR BLD AUTO: 1.2 %
BENZODIAZ UR QL SCN: NOT DETECTED NG/ML
BILIRUB SERPL-MCNC: 0.2 MG/DL (ref 0.2–1.3)
BILIRUB UR QL STRIP: NEGATIVE
BUN SERPL-MCNC: 14 MG/DL (ref 7–30)
BUPRENORPHINE UR QL: NOT DETECTED NG/ML
CALCIUM SERPL-MCNC: 8.8 MG/DL (ref 8.5–10.1)
CANNABINOIDS UR QL: NOT DETECTED NG/ML
CHLORIDE SERPL-SCNC: 111 MMOL/L (ref 94–109)
CO2 SERPL-SCNC: 27 MMOL/L (ref 20–32)
COCAINE UR QL SCN: NOT DETECTED NG/ML
COLOR UR AUTO: YELLOW
CREAT SERPL-MCNC: 1.02 MG/DL (ref 0.52–1.04)
D-METHAMPHET UR QL: NOT DETECTED NG/ML
DEPRECATED CALCIDIOL+CALCIFEROL SERPL-MC: 37 UG/L (ref 20–75)
DIFFERENTIAL METHOD BLD: NORMAL
EOSINOPHIL # BLD AUTO: 0.2 10E9/L (ref 0–0.7)
EOSINOPHIL NFR BLD AUTO: 2.8 %
ERYTHROCYTE [DISTWIDTH] IN BLOOD BY AUTOMATED COUNT: 12.9 % (ref 10–15)
GFR SERPL CREATININE-BSD FRML MDRD: 53 ML/MIN/{1.73_M2}
GLUCOSE SERPL-MCNC: 90 MG/DL (ref 70–99)
GLUCOSE UR STRIP-MCNC: NEGATIVE MG/DL
HCT VFR BLD AUTO: 37.1 % (ref 35–47)
HGB BLD-MCNC: 12.1 G/DL (ref 11.7–15.7)
HGB UR QL STRIP: NEGATIVE
IMM GRANULOCYTES # BLD: 0 10E9/L (ref 0–0.4)
IMM GRANULOCYTES NFR BLD: 0.4 %
KETONES UR STRIP-MCNC: NEGATIVE MG/DL
LEUKOCYTE ESTERASE UR QL STRIP: NEGATIVE
LYMPHOCYTES # BLD AUTO: 2.4 10E9/L (ref 0.8–5.3)
LYMPHOCYTES NFR BLD AUTO: 41.6 %
MCH RBC QN AUTO: 30.9 PG (ref 26.5–33)
MCHC RBC AUTO-ENTMCNC: 32.6 G/DL (ref 31.5–36.5)
MCV RBC AUTO: 95 FL (ref 78–100)
METHADONE UR QL SCN: NOT DETECTED NG/ML
MONOCYTES # BLD AUTO: 0.6 10E9/L (ref 0–1.3)
MONOCYTES NFR BLD AUTO: 10 %
MUCOUS THREADS #/AREA URNS LPF: PRESENT /LPF
NEUTROPHILS # BLD AUTO: 2.5 10E9/L (ref 1.6–8.3)
NEUTROPHILS NFR BLD AUTO: 44 %
NITRATE UR QL: NEGATIVE
NRBC # BLD AUTO: 0 10*3/UL
NRBC BLD AUTO-RTO: 0 /100
OPIATES UR QL SCN: NOT DETECTED NG/ML
OXYCODONE UR QL SCN: NOT DETECTED NG/ML
PCP UR QL SCN: NOT DETECTED NG/ML
PH UR STRIP: 5.5 PH (ref 4.7–8)
PLATELET # BLD AUTO: 284 10E9/L (ref 150–450)
POTASSIUM SERPL-SCNC: 4.5 MMOL/L (ref 3.4–5.3)
PROPOXYPH UR QL: NOT DETECTED NG/ML
PROT SERPL-MCNC: 6.6 G/DL (ref 6.8–8.8)
RBC # BLD AUTO: 3.92 10E12/L (ref 3.8–5.2)
RBC #/AREA URNS AUTO: 1 /HPF (ref 0–2)
SALICYLATES SERPL-MCNC: 2 MG/DL
SARS-COV-2 PCR COMMENT: NORMAL
SARS-COV-2 RNA SPEC QL NAA+PROBE: NEGATIVE
SARS-COV-2 RNA SPEC QL NAA+PROBE: NORMAL
SODIUM SERPL-SCNC: 142 MMOL/L (ref 133–144)
SOURCE: ABNORMAL
SP GR UR STRIP: 1.02 (ref 1–1.03)
SPECIMEN SOURCE: NORMAL
SPECIMEN SOURCE: NORMAL
TRICYCLICS UR QL SCN: NOT DETECTED NG/ML
TSH SERPL DL<=0.005 MIU/L-ACNC: 2.38 MU/L (ref 0.4–4)
UROBILINOGEN UR STRIP-MCNC: NORMAL MG/DL (ref 0–2)
WBC # BLD AUTO: 5.7 10E9/L (ref 4–11)
WBC #/AREA URNS AUTO: 1 /HPF (ref 0–5)

## 2020-01-01 PROCEDURE — 80329 ANALGESICS NON-OPIOID 1 OR 2: CPT | Performed by: EMERGENCY MEDICINE

## 2020-01-01 PROCEDURE — 80306 DRUG TEST PRSMV INSTRMNT: CPT | Performed by: EMERGENCY MEDICINE

## 2020-01-01 PROCEDURE — 25000132 ZZH RX MED GY IP 250 OP 250 PS 637: Mod: GY | Performed by: NURSE PRACTITIONER

## 2020-01-01 PROCEDURE — 99222 1ST HOSP IP/OBS MODERATE 55: CPT | Performed by: NURSE PRACTITIONER

## 2020-01-01 PROCEDURE — 87635 SARS-COV-2 COVID-19 AMP PRB: CPT | Performed by: EMERGENCY MEDICINE

## 2020-01-01 PROCEDURE — 12400000 ZZH R&B MH

## 2020-01-01 PROCEDURE — 97165 OT EVAL LOW COMPLEX 30 MIN: CPT | Mod: GO

## 2020-01-01 PROCEDURE — 99285 EMERGENCY DEPT VISIT HI MDM: CPT

## 2020-01-01 PROCEDURE — 82306 VITAMIN D 25 HYDROXY: CPT | Performed by: NURSE PRACTITIONER

## 2020-01-01 PROCEDURE — 97530 THERAPEUTIC ACTIVITIES: CPT | Mod: GO

## 2020-01-01 PROCEDURE — 99231 SBSQ HOSP IP/OBS SF/LOW 25: CPT | Performed by: NURSE PRACTITIONER

## 2020-01-01 PROCEDURE — 99223 1ST HOSP IP/OBS HIGH 75: CPT | Mod: AI | Performed by: NURSE PRACTITIONER

## 2020-01-01 PROCEDURE — C9803 HOPD COVID-19 SPEC COLLECT: HCPCS

## 2020-01-01 PROCEDURE — 36415 COLL VENOUS BLD VENIPUNCTURE: CPT | Performed by: NURSE PRACTITIONER

## 2020-01-01 PROCEDURE — 97535 SELF CARE MNGMENT TRAINING: CPT | Mod: GO

## 2020-01-01 PROCEDURE — 84443 ASSAY THYROID STIM HORMONE: CPT | Performed by: NURSE PRACTITIONER

## 2020-01-01 PROCEDURE — 99232 SBSQ HOSP IP/OBS MODERATE 35: CPT | Performed by: NURSE PRACTITIONER

## 2020-01-01 PROCEDURE — 80053 COMPREHEN METABOLIC PANEL: CPT | Performed by: EMERGENCY MEDICINE

## 2020-01-01 PROCEDURE — 99239 HOSP IP/OBS DSCHRG MGMT >30: CPT | Performed by: NURSE PRACTITIONER

## 2020-01-01 PROCEDURE — 99283 EMERGENCY DEPT VISIT LOW MDM: CPT | Mod: Z6 | Performed by: EMERGENCY MEDICINE

## 2020-01-01 PROCEDURE — 85025 COMPLETE CBC W/AUTO DIFF WBC: CPT | Performed by: EMERGENCY MEDICINE

## 2020-01-01 PROCEDURE — 81001 URINALYSIS AUTO W/SCOPE: CPT | Performed by: NURSE PRACTITIONER

## 2020-01-01 PROCEDURE — 36415 COLL VENOUS BLD VENIPUNCTURE: CPT | Performed by: EMERGENCY MEDICINE

## 2020-01-01 PROCEDURE — 99233 SBSQ HOSP IP/OBS HIGH 50: CPT | Performed by: NURSE PRACTITIONER

## 2020-01-01 PROCEDURE — 97110 THERAPEUTIC EXERCISES: CPT | Mod: GO

## 2020-01-01 RX ORDER — TOLTERODINE 4 MG/1
4 CAPSULE, EXTENDED RELEASE ORAL DAILY
Status: DISCONTINUED | OUTPATIENT
Start: 2020-01-01 | End: 2020-01-01 | Stop reason: HOSPADM

## 2020-01-01 RX ORDER — HYDROXYZINE HYDROCHLORIDE 25 MG/1
50-100 TABLET, FILM COATED ORAL EVERY 4 HOURS PRN
Status: DISCONTINUED | OUTPATIENT
Start: 2020-01-01 | End: 2020-01-01 | Stop reason: HOSPADM

## 2020-01-01 RX ORDER — ALUMINA, MAGNESIA, AND SIMETHICONE 2400; 2400; 240 MG/30ML; MG/30ML; MG/30ML
30 SUSPENSION ORAL EVERY 4 HOURS PRN
Status: DISCONTINUED | OUTPATIENT
Start: 2020-01-01 | End: 2020-01-01 | Stop reason: HOSPADM

## 2020-01-01 RX ORDER — VITAMIN E 268 MG
1200 CAPSULE ORAL DAILY
Status: DISCONTINUED | OUTPATIENT
Start: 2020-01-01 | End: 2020-01-01

## 2020-01-01 RX ORDER — PHENOBARBITAL 16.2 MG/1
16.2 TABLET ORAL DAILY
Status: DISCONTINUED | OUTPATIENT
Start: 2020-01-01 | End: 2020-01-01

## 2020-01-01 RX ORDER — VITAMIN E 268 MG
400 CAPSULE ORAL DAILY
Status: DISCONTINUED | OUTPATIENT
Start: 2020-01-01 | End: 2020-01-01

## 2020-01-01 RX ORDER — VITAMIN E 268 MG
1200 CAPSULE ORAL DAILY
Status: DISCONTINUED | OUTPATIENT
Start: 2020-01-01 | End: 2020-01-01 | Stop reason: HOSPADM

## 2020-01-01 RX ORDER — PHENOBARBITAL 32.4 MG/1
32.4 TABLET ORAL 2 TIMES DAILY
Status: DISCONTINUED | OUTPATIENT
Start: 2020-01-01 | End: 2020-01-01

## 2020-01-01 RX ORDER — VENLAFAXINE 75 MG/1
75 TABLET ORAL AT BEDTIME
Status: ON HOLD | COMMUNITY
End: 2020-01-01

## 2020-01-01 RX ORDER — LACTOBACILLUS RHAMNOSUS GG 10B CELL
1 CAPSULE ORAL 2 TIMES DAILY
Status: DISCONTINUED | OUTPATIENT
Start: 2020-01-01 | End: 2020-01-01 | Stop reason: HOSPADM

## 2020-01-01 RX ORDER — FAMOTIDINE 20 MG
2000 TABLET ORAL DAILY
COMMUNITY

## 2020-01-01 RX ORDER — CLONAZEPAM 1 MG/1
.5-1 TABLET ORAL 2 TIMES DAILY
Status: ON HOLD | COMMUNITY
End: 2020-01-01

## 2020-01-01 RX ORDER — ACETAMINOPHEN 325 MG/1
650 TABLET ORAL EVERY 4 HOURS PRN
Status: DISCONTINUED | OUTPATIENT
Start: 2020-01-01 | End: 2020-01-01 | Stop reason: HOSPADM

## 2020-01-01 RX ORDER — LIOTHYRONINE SODIUM 5 UG/1
10 TABLET ORAL DAILY
Status: DISCONTINUED | OUTPATIENT
Start: 2020-01-01 | End: 2020-01-01 | Stop reason: HOSPADM

## 2020-01-01 RX ORDER — PHENAZOPYRIDINE HYDROCHLORIDE 100 MG/1
100 TABLET, FILM COATED ORAL 3 TIMES DAILY PRN
Status: ON HOLD | COMMUNITY
End: 2020-01-01

## 2020-01-01 RX ORDER — TRAZODONE HYDROCHLORIDE 50 MG/1
50 TABLET, FILM COATED ORAL
Status: DISCONTINUED | OUTPATIENT
Start: 2020-01-01 | End: 2020-01-01 | Stop reason: HOSPADM

## 2020-01-01 RX ORDER — CIPROFLOXACIN 500 MG/1
500 TABLET, FILM COATED ORAL 2 TIMES DAILY
Status: ON HOLD | COMMUNITY
Start: 2020-01-01 | End: 2020-01-01

## 2020-01-01 RX ORDER — PHENOBARBITAL 16.2 MG/1
16.2 TABLET ORAL 2 TIMES DAILY
Status: DISCONTINUED | OUTPATIENT
Start: 2020-01-01 | End: 2020-01-01

## 2020-01-01 RX ORDER — VITAMIN B COMPLEX
2000 TABLET ORAL DAILY
Status: DISCONTINUED | OUTPATIENT
Start: 2020-01-01 | End: 2020-01-01 | Stop reason: HOSPADM

## 2020-01-01 RX ORDER — GABAPENTIN 100 MG/1
100 CAPSULE ORAL AT BEDTIME
Status: DISCONTINUED | OUTPATIENT
Start: 2020-01-01 | End: 2020-01-01 | Stop reason: HOSPADM

## 2020-01-01 RX ORDER — LIOTHYRONINE SODIUM 5 UG/1
10 TABLET ORAL DAILY
Qty: 30 TABLET | Refills: 0 | Status: SHIPPED | OUTPATIENT
Start: 2020-01-01

## 2020-01-01 RX ORDER — LACTOBACILLUS RHAMNOSUS GG 10B CELL
1 CAPSULE ORAL 2 TIMES DAILY
Qty: 60 CAPSULE | Refills: 0 | Status: SHIPPED | OUTPATIENT
Start: 2020-01-01

## 2020-01-01 RX ORDER — CIPROFLOXACIN 500 MG/1
500 TABLET, FILM COATED ORAL 2 TIMES DAILY
Status: COMPLETED | OUTPATIENT
Start: 2020-01-01 | End: 2020-01-01

## 2020-01-01 RX ORDER — BISACODYL 10 MG
10 SUPPOSITORY, RECTAL RECTAL DAILY PRN
Status: DISCONTINUED | OUTPATIENT
Start: 2020-01-01 | End: 2020-01-01 | Stop reason: HOSPADM

## 2020-01-01 RX ORDER — CLONAZEPAM 0.5 MG/1
0.5 TABLET ORAL DAILY
Status: ON HOLD | COMMUNITY
End: 2020-01-01

## 2020-01-01 RX ORDER — VENLAFAXINE HYDROCHLORIDE 75 MG/1
75 CAPSULE, EXTENDED RELEASE ORAL AT BEDTIME
Status: ON HOLD | COMMUNITY
End: 2020-01-01

## 2020-01-01 RX ORDER — DEXTROAMPHETAMINE SACCHARATE, AMPHETAMINE ASPARTATE, DEXTROAMPHETAMINE SULFATE AND AMPHETAMINE SULFATE 5; 5; 5; 5 MG/1; MG/1; MG/1; MG/1
40 TABLET ORAL EVERY MORNING
Status: ON HOLD | COMMUNITY
End: 2020-01-01

## 2020-01-01 RX ADMIN — VORTIOXETINE 5 MG: 5 TABLET, FILM COATED ORAL at 13:58

## 2020-01-01 RX ADMIN — CIPROFLOXACIN HYDROCHLORIDE 500 MG: 500 TABLET, FILM COATED ORAL at 08:13

## 2020-01-01 RX ADMIN — MELATONIN 2000 UNITS: at 09:26

## 2020-01-01 RX ADMIN — Medication 1 CAPSULE: at 18:13

## 2020-01-01 RX ADMIN — PHENOBARBITAL 16.2 MG: 16.2 TABLET ORAL at 20:30

## 2020-01-01 RX ADMIN — TOLTERODINE 4 MG: 4 CAPSULE, EXTENDED RELEASE ORAL at 18:13

## 2020-01-01 RX ADMIN — Medication 1200 UNITS: at 08:57

## 2020-01-01 RX ADMIN — Medication 2 TABLET: at 21:10

## 2020-01-01 RX ADMIN — Medication 1 CAPSULE: at 11:45

## 2020-01-01 RX ADMIN — PHENOBARBITAL 32.4 MG: 32.4 TABLET ORAL at 08:37

## 2020-01-01 RX ADMIN — LIOTHYRONINE SODIUM 10 MCG: 5 TABLET ORAL at 18:13

## 2020-01-01 RX ADMIN — GABAPENTIN 100 MG: 100 CAPSULE ORAL at 21:06

## 2020-01-01 RX ADMIN — VORTIOXETINE 2.5 MG: 5 TABLET, FILM COATED ORAL at 08:13

## 2020-01-01 RX ADMIN — VORTIOXETINE 2.5 MG: 5 TABLET, FILM COATED ORAL at 09:35

## 2020-01-01 RX ADMIN — LIOTHYRONINE SODIUM 10 MCG: 5 TABLET ORAL at 08:52

## 2020-01-01 RX ADMIN — PHENOBARBITAL 32.4 MG: 32.4 TABLET ORAL at 21:10

## 2020-01-01 RX ADMIN — CIPROFLOXACIN HYDROCHLORIDE 500 MG: 500 TABLET, FILM COATED ORAL at 20:30

## 2020-01-01 RX ADMIN — TOLTERODINE 4 MG: 4 CAPSULE, EXTENDED RELEASE ORAL at 08:29

## 2020-01-01 RX ADMIN — PHENOBARBITAL 16.2 MG: 16.2 TABLET ORAL at 21:06

## 2020-01-01 RX ADMIN — CIPROFLOXACIN HYDROCHLORIDE 500 MG: 500 TABLET, FILM COATED ORAL at 21:06

## 2020-01-01 RX ADMIN — MELATONIN 2000 UNITS: at 09:18

## 2020-01-01 RX ADMIN — Medication 1 CAPSULE: at 17:28

## 2020-01-01 RX ADMIN — PHENOBARBITAL 16.2 MG: 16.2 TABLET ORAL at 09:26

## 2020-01-01 RX ADMIN — CIPROFLOXACIN HYDROCHLORIDE 500 MG: 500 TABLET, FILM COATED ORAL at 08:12

## 2020-01-01 RX ADMIN — Medication 1200 UNITS: at 09:35

## 2020-01-01 RX ADMIN — PHENOBARBITAL 8.1 MG: 16.2 TABLET ORAL at 09:18

## 2020-01-01 RX ADMIN — MELATONIN 2000 UNITS: at 08:52

## 2020-01-01 RX ADMIN — CIPROFLOXACIN HYDROCHLORIDE 500 MG: 500 TABLET, FILM COATED ORAL at 21:10

## 2020-01-01 RX ADMIN — PHENOBARBITAL 8.1 MG: 16.2 TABLET ORAL at 20:19

## 2020-01-01 RX ADMIN — LIOTHYRONINE SODIUM 10 MCG: 5 TABLET ORAL at 09:26

## 2020-01-01 RX ADMIN — Medication 1 CAPSULE: at 16:33

## 2020-01-01 RX ADMIN — Medication 1 CAPSULE: at 11:55

## 2020-01-01 RX ADMIN — Medication 1 CAPSULE: at 17:10

## 2020-01-01 RX ADMIN — PHENOBARBITAL 16.2 MG: 16.2 TABLET ORAL at 08:13

## 2020-01-01 RX ADMIN — LIOTHYRONINE SODIUM 10 MCG: 5 TABLET ORAL at 09:18

## 2020-01-01 RX ADMIN — TOLTERODINE 4 MG: 4 CAPSULE, EXTENDED RELEASE ORAL at 08:13

## 2020-01-01 RX ADMIN — Medication 1 CAPSULE: at 11:33

## 2020-01-01 RX ADMIN — CIPROFLOXACIN HYDROCHLORIDE 500 MG: 500 TABLET, FILM COATED ORAL at 08:28

## 2020-01-01 RX ADMIN — VORTIOXETINE 2.5 MG: 5 TABLET, FILM COATED ORAL at 08:57

## 2020-01-01 RX ADMIN — TOLTERODINE 4 MG: 4 CAPSULE, EXTENDED RELEASE ORAL at 10:01

## 2020-01-01 RX ADMIN — PHENOBARBITAL 8.1 MG: 16.2 TABLET ORAL at 20:11

## 2020-01-01 RX ADMIN — VORTIOXETINE 5 MG: 5 TABLET, FILM COATED ORAL at 08:28

## 2020-01-01 RX ADMIN — CIPROFLOXACIN HYDROCHLORIDE 500 MG: 500 TABLET, FILM COATED ORAL at 20:15

## 2020-01-01 RX ADMIN — PHENOBARBITAL 16.2 MG: 16.2 TABLET ORAL at 08:12

## 2020-01-01 RX ADMIN — LIOTHYRONINE SODIUM 10 MCG: 5 TABLET ORAL at 08:13

## 2020-01-01 RX ADMIN — PHENOBARBITAL 16.2 MG: 16.2 TABLET ORAL at 20:15

## 2020-01-01 RX ADMIN — LIOTHYRONINE SODIUM 10 MCG: 5 TABLET ORAL at 08:28

## 2020-01-01 RX ADMIN — GABAPENTIN 100 MG: 100 CAPSULE ORAL at 20:19

## 2020-01-01 RX ADMIN — CIPROFLOXACIN HYDROCHLORIDE 500 MG: 500 TABLET, FILM COATED ORAL at 13:54

## 2020-01-01 RX ADMIN — PHENOBARBITAL 16.2 MG: 16.2 TABLET ORAL at 10:01

## 2020-01-01 RX ADMIN — Medication 1 CAPSULE: at 17:52

## 2020-01-01 RX ADMIN — GABAPENTIN 100 MG: 100 CAPSULE ORAL at 20:30

## 2020-01-01 RX ADMIN — Medication 1 CAPSULE: at 11:08

## 2020-01-01 RX ADMIN — MELATONIN 2000 UNITS: at 14:20

## 2020-01-01 RX ADMIN — PHENOBARBITAL 32.4 MG: 32.4 TABLET ORAL at 13:55

## 2020-01-01 RX ADMIN — TOLTERODINE 4 MG: 4 CAPSULE, EXTENDED RELEASE ORAL at 09:18

## 2020-01-01 RX ADMIN — Medication 1200 UNITS: at 09:30

## 2020-01-01 RX ADMIN — TRAZODONE HYDROCHLORIDE 50 MG: 50 TABLET ORAL at 00:01

## 2020-01-01 RX ADMIN — LIOTHYRONINE SODIUM 10 MCG: 5 TABLET ORAL at 08:12

## 2020-01-01 RX ADMIN — VORTIOXETINE 2.5 MG: 5 TABLET, FILM COATED ORAL at 09:27

## 2020-01-01 RX ADMIN — GABAPENTIN 100 MG: 100 CAPSULE ORAL at 20:15

## 2020-01-01 RX ADMIN — Medication 1 CAPSULE: at 10:01

## 2020-01-01 RX ADMIN — PHENOBARBITAL 8.1 MG: 16.2 TABLET ORAL at 20:22

## 2020-01-01 RX ADMIN — TOLTERODINE 4 MG: 4 CAPSULE, EXTENDED RELEASE ORAL at 08:12

## 2020-01-01 RX ADMIN — Medication 1 CAPSULE: at 17:01

## 2020-01-01 RX ADMIN — GABAPENTIN 100 MG: 100 CAPSULE ORAL at 20:22

## 2020-01-01 RX ADMIN — GABAPENTIN 100 MG: 100 CAPSULE ORAL at 20:11

## 2020-01-01 RX ADMIN — Medication 1 CAPSULE: at 11:21

## 2020-01-01 RX ADMIN — LIOTHYRONINE SODIUM 10 MCG: 5 TABLET ORAL at 10:01

## 2020-01-01 RX ADMIN — TOLTERODINE 4 MG: 4 CAPSULE, EXTENDED RELEASE ORAL at 09:26

## 2020-01-01 RX ADMIN — VORTIOXETINE 2.5 MG: 5 TABLET, FILM COATED ORAL at 10:01

## 2020-01-01 RX ADMIN — TOLTERODINE 4 MG: 4 CAPSULE, EXTENDED RELEASE ORAL at 08:52

## 2020-01-01 RX ADMIN — Medication 2 TABLET: at 21:06

## 2020-01-01 RX ADMIN — VORTIOXETINE 2.5 MG: 5 TABLET, FILM COATED ORAL at 11:55

## 2020-01-01 RX ADMIN — PHENOBARBITAL 8.1 MG: 16.2 TABLET ORAL at 08:52

## 2020-01-01 RX ADMIN — Medication 1200 UNITS: at 11:33

## 2020-01-01 RX ADMIN — GABAPENTIN 100 MG: 100 CAPSULE ORAL at 21:10

## 2020-01-01 RX ADMIN — TRAZODONE HYDROCHLORIDE 50 MG: 50 TABLET ORAL at 23:01

## 2020-01-01 ASSESSMENT — ACTIVITIES OF DAILY LIVING (ADL)
DRESS: SCRUBS (BEHAVIORAL HEALTH);WITH ASSISTANCE
TRANSFERRING: 0-->INDEPENDENT
DRESS: 0-->INDEPENDENT
DRESS: SCRUBS (BEHAVIORAL HEALTH);WITH ASSISTANCE
NUMBER_OF_TIMES_PATIENT_HAS_FALLEN_WITHIN_LAST_SIX_MONTHS: 10
HYGIENE/GROOMING: INDEPENDENT;WITH SUPERVISION
RETIRED_COMMUNICATION: 0-->UNDERSTANDS/COMMUNICATES WITHOUT DIFFICULTY
ORAL_HYGIENE: INDEPENDENT
ORAL_HYGIENE: INDEPENDENT
DRESS: INDEPENDENT;SCRUBS (BEHAVIORAL HEALTH)
DRESS: INDEPENDENT;SCRUBS (BEHAVIORAL HEALTH)
ORAL_HYGIENE: INDEPENDENT
AMBULATION: 0-->INDEPENDENT
LAUNDRY: UNABLE TO COMPLETE
COGNITION: 0 - NO COGNITION ISSUES REPORTED
HYGIENE/GROOMING: INDEPENDENT
LAUNDRY: UNABLE TO COMPLETE
DRESS: SCRUBS (BEHAVIORAL HEALTH);INDEPENDENT
DRESS: SCRUBS (BEHAVIORAL HEALTH)
BATHING: 0-->INDEPENDENT
HYGIENE/GROOMING: INDEPENDENT
LAUNDRY: UNABLE TO COMPLETE
HYGIENE/GROOMING: INDEPENDENT
LAUNDRY: UNABLE TO COMPLETE
ORAL_HYGIENE: INDEPENDENT
HYGIENE/GROOMING: INDEPENDENT
RETIRED_EATING: 0-->INDEPENDENT
FALL_HISTORY_WITHIN_LAST_SIX_MONTHS: YES
ORAL_HYGIENE: INDEPENDENT
DRESS: SCRUBS (BEHAVIORAL HEALTH);INDEPENDENT
HYGIENE/GROOMING: INDEPENDENT
LAUNDRY: UNABLE TO COMPLETE
SWALLOWING: 0-->SWALLOWS FOODS/LIQUIDS WITHOUT DIFFICULTY
DRESS: SCRUBS (BEHAVIORAL HEALTH)
LAUNDRY: UNABLE TO COMPLETE
HYGIENE/GROOMING: INDEPENDENT;SHOWER
ORAL_HYGIENE: INDEPENDENT;WITH SUPERVISION
HYGIENE/GROOMING: INDEPENDENT;SHOWER
TOILETING: 0-->INDEPENDENT
ORAL_HYGIENE: INDEPENDENT

## 2020-01-01 ASSESSMENT — ENCOUNTER SYMPTOMS
FEVER: 0
FATIGUE: 0
SHORTNESS OF BREATH: 0
COUGH: 0

## 2020-01-01 ASSESSMENT — MIFFLIN-ST. JEOR
SCORE: 1279.57
SCORE: 1302.82

## 2020-06-22 PROBLEM — J44.1 CHRONIC OBSTRUCTIVE PULMONARY DISEASE WITH ACUTE EXACERBATION (H): Status: ACTIVE | Noted: 2020-01-01

## 2020-06-22 PROBLEM — G47.33 OSA (OBSTRUCTIVE SLEEP APNEA): Status: ACTIVE | Noted: 2020-01-01

## 2020-06-22 PROBLEM — N32.81 OVERACTIVE BLADDER: Status: ACTIVE | Noted: 2020-01-01

## 2020-06-22 PROBLEM — F41.9 ANXIETY: Status: ACTIVE | Noted: 2020-01-01

## 2020-06-22 PROBLEM — T14.91XA SUICIDE ATTEMPT (H): Status: ACTIVE | Noted: 2020-01-01

## 2020-06-22 NOTE — ED NOTES
Poison control notified and they do not have any recommendations at this time other than the usual lab draw. Pt reports to taking klonopin at 1630 yesterday.

## 2020-06-22 NOTE — H&P
"Psychiatric Eval/H&P    Patient Name: Alycia France   YOB: 1945  Age: 75 year old  [id number]    Primary Physician: Adalgisa Foote   Completed by KARMEN McqueenLYNDSEY     Netta   Primary psychiatrist/NP Dr. Harp  Therapist none that I am aware of   Family contact:niece though will need to sign release          CC: \"I cant do anything\"    HPI   Alycia France is a 75 year old  female who reports an increase in depression over the past month. Her psychiatrist recently started her on trintillex which she was suppose to  today. She overdosed on 8 klonopin and called EMS. She reported to EMS she had been contemplating suicide and had a loaded gun. EMS confirmed she had a loaded shotgun in her home when they arrived. She is not a good historian and is a bit sedated and very vauge when I meet with her. She doesn't appear to have confusion though is sedated. Her dykinesia is obvious. She laid in bed the entire time I tried to interview her and appeared to have truncal and lower extremity dyskinesia which she has been on ingrezza for since 2018.     She tells me she was suppose to pick trintillex up today which was recently prescibed to her. She states she does not have chronic suicidal thoughts. When I ask her if she has chronic suicidal thougths She sarcastically states with an irritable tone \"I just got the gun last week, do you think I just leave guns laying around my house?\" I ask her why she purchased her gun and she states angirly \"its not your business\" though then begins to cry and tells me \"I got it in case I didn't get better\".      she states she is not always \"suicidal, but is always depressed\". When I ask her what her primary symptoms are she states \"I cant do anything. I have no energy or drive\". She states adderall worked for a few years though \"quit working\". When I ask her about manic symptoms she states \"I dont have manic depression, Im never happy\". She " "sleeps very little at night. She states she doesn't feel tired enough to fall asleep. I ask her if she has a lot of anxious thoughts keeping her awake and she states she does though when I discuss this could potentially be racing thoughts contributing to her not be able to relax and sleep at night she states \"they are not racing, I just think about things like \"will I ever feel better\". She states her sleep was not disrupted until a few years ago. She is very tearful. At one point she rolls over in bed, faces the wall and sobs.           Past Psychiatric History:   One hospitalization in 2019 here for 3 days.   One hospitlization 20 years ago. I am going to request records for this hospital stay  She has gone to the ketamine program which was not beneficial  She has never had ECT  She has never had TMS thearpy  Has been seeing Dr. Harp at [facility] for \"a couple of years\"  Saw Dr. Healy at [facility] prior to this- he started her on risperdal for depression per her report       Social History:     Graduated high school and has a bachelors degree in horticulture  She lives alone. She is  and has no children. She is close with a niece. Is not in a realtionship  Is on disability  Hobbies used to include gardening and has cats that she is veyr fond of.       Chemical Use History: none     Family Psychiatric History: none known other than anxiety     Medical History and ROS    Prior to Admission medications    Medication Sig Start Date End Date Taking? Authorizing Provider   ciprofloxacin (CIPRO) 500 MG tablet Take 500 mg by mouth 2 times daily   Yes Reported, Patient   clonazePAM (KLONOPIN) 1 MG tablet Take 1 mg by mouth 2 times daily as needed for anxiety   Yes Reported, Patient   gabapentin (NEURONTIN) 100 MG capsule Take 100 mg by mouth At Bedtime   Yes Reported, Patient   solifenacin (VESICARE) 10 MG tablet Take 10 mg by mouth daily   Yes Sugar Quiles MD   valbenazine " (INGREZZA) 40 MG capsule Take 40 mg by mouth daily   Yes Reported, Patient   venlafaxine (EFFEXOR) 75 MG tablet Take 75 mg by mouth At Bedtime   Yes Reported, Patient   eszopiclone (LUNESTA) 3 MG tablet Take 3 mg by mouth At Bedtime    Reported, Patient   hydrOXYzine (ATARAX) 25 MG tablet Take 1 tablet (25 mg) by mouth 3 times daily as needed for anxiety 9/15/19   Maria Del Carmen Edwards, APRN CNP     No Known Allergies  Past Medical History:   Diagnosis Date     ACP (advance care planning) 12/28/2016    Patient refused information      ADD (attention deficit disorder)      Anxiety      Chronic obstructive pulmonary disease with acute exacerbation (H) 6/22/2020     COPD (chronic obstructive pulmonary disease) (H)      Depression      Insomnia 5/8/2014     Major depressive disorder, recurrent episode, moderate (H) 2/24/2014     Oral lesion 11/21/2013     OBDULIO (obstructive sleep apnea) 6/22/2020     Overactive bladder 6/22/2020     Suicidal ideation 9/13/2019     Suicide attempt (H) 6/22/2020     Tardive dyskinesia      Urge urinary incontinence 12/15/2014     Past Surgical History:   Procedure Laterality Date     APPENDECTOMY       CHOLECYSTECTOMY       SIGMOIDOSCOPY RIGID       thyroid resection, benign tumor         Current Medications   Medications Prior to Admission   Medication Sig Dispense Refill Last Dose     ciprofloxacin (CIPRO) 500 MG tablet Take 500 mg by mouth 2 times daily   6/21/2020 at Unknown time     clonazePAM (KLONOPIN) 1 MG tablet Take 1 mg by mouth 2 times daily as needed for anxiety   6/21/2020 at Unknown time     gabapentin (NEURONTIN) 100 MG capsule Take 100 mg by mouth At Bedtime   6/21/2020 at Unknown time     solifenacin (VESICARE) 10 MG tablet Take 10 mg by mouth daily   6/21/2020 at Unknown time     valbenazine (INGREZZA) 40 MG capsule Take 80 mg by mouth daily    6/21/2020 at Unknown time     venlafaxine (EFFEXOR) 75 MG tablet Take 75 mg by mouth At Bedtime   6/21/2020     eszopiclone  "(LUNESTA) 3 MG tablet Take 3 mg by mouth At Bedtime   Unknown at Unknown time     hydrOXYzine (ATARAX) 25 MG tablet Take 1 tablet (25 mg) by mouth 3 times daily as needed for anxiety 90 tablet 0 More than a month at Unknown time          Past Psychiatric Medications Tried Prozac, Effexor, Wellbutrin, Latuda, Remeron, Zoloft, Paxil, Pristiq, buspar, trazodone, Zolpidem, Valium, and Fetzima.   risperdal  \"a bunch of stuff\"  \"prozac worked the best\" it quit working, then it was discontinued. She then retried it again years later and it didn't work.           Physical Exam/ROS:     Please refer to the physical exam completed on by arsenio geronimo on 6/22/20. No Further issues of concern noted           MSE/PSYCH  PSYCHIATRIC EXAM  /67   Pulse 66   Temp 97.6  F (36.4  C) (Tympanic)   Resp 16   Ht 1.653 m (5' 5.08\")   Wt 74.8 kg (165 lb)   SpO2 94%   BMI 27.39 kg/m    -Appearance/Behavior: slightly disheveled.    -Motor: psychotmotor retadation  -Gait: not observed as she was laying in bed  -Abnormal involuntary movements: tardive dyskinesia  -Mood: dysphoirc  -Affect: sad  -Speech: monotone         -Thought process/associations: no loose associations  -Thought content: suicidal and hopeless  -Perceptual disturbances: no hallucinations or delusion  -Suicidal/Homicidal Ideation: suicidal with plan to shoot self. Had loaded gun she purchased last week.   -Judgment: Poor.  -Insight: Poor.  *Orientation: time, place and person.  *Memory: intact  *Attention: intact  *Language: fluent, no aphasias, able to repeat phrases and name objects. Vocab intact.  *Fund of information: average  *Cognitive functioning estimate: 0 - independent.     Labs:   Results for orders placed or performed during the hospital encounter of 06/22/20   CBC with platelets differential     Status: None   Result Value Ref Range    WBC 5.7 4.0 - 11.0 10e9/L    RBC Count 3.92 3.8 - 5.2 10e12/L    Hemoglobin 12.1 11.7 - 15.7 g/dL    Hematocrit 37.1 " 35.0 - 47.0 %    MCV 95 78 - 100 fl    MCH 30.9 26.5 - 33.0 pg    MCHC 32.6 31.5 - 36.5 g/dL    RDW 12.9 10.0 - 15.0 %    Platelet Count 284 150 - 450 10e9/L    Diff Method Automated Method     % Neutrophils 44.0 %    % Lymphocytes 41.6 %    % Monocytes 10.0 %    % Eosinophils 2.8 %    % Basophils 1.2 %    % Immature Granulocytes 0.4 %    Nucleated RBCs 0 0 /100    Absolute Neutrophil 2.5 1.6 - 8.3 10e9/L    Absolute Lymphocytes 2.4 0.8 - 5.3 10e9/L    Absolute Monocytes 0.6 0.0 - 1.3 10e9/L    Absolute Eosinophils 0.2 0.0 - 0.7 10e9/L    Absolute Basophils 0.1 0.0 - 0.2 10e9/L    Abs Immature Granulocytes 0.0 0 - 0.4 10e9/L    Absolute Nucleated RBC 0.0    Comprehensive metabolic panel     Status: Abnormal   Result Value Ref Range    Sodium 142 133 - 144 mmol/L    Potassium 4.5 3.4 - 5.3 mmol/L    Chloride 111 (H) 94 - 109 mmol/L    Carbon Dioxide 27 20 - 32 mmol/L    Anion Gap 4 3 - 14 mmol/L    Glucose 90 70 - 99 mg/dL    Urea Nitrogen 14 7 - 30 mg/dL    Creatinine 1.02 0.52 - 1.04 mg/dL    GFR Estimate 53 (L) >60 mL/min/[1.73_m2]    GFR Estimate If Black 62 >60 mL/min/[1.73_m2]    Calcium 8.8 8.5 - 10.1 mg/dL    Bilirubin Total 0.2 0.2 - 1.3 mg/dL    Albumin 3.4 3.4 - 5.0 g/dL    Protein Total 6.6 (L) 6.8 - 8.8 g/dL    Alkaline Phosphatase 41 40 - 150 U/L    ALT 21 0 - 50 U/L    AST 15 0 - 45 U/L   Asymptomatic COVID-19 Virus (Coronavirus) by PCR     Status: None    Specimen: Nasopharyngeal   Result Value Ref Range    COVID-19 Virus PCR to [facility] - Source Nasopharyngeal     COVID-19 Virus PCR to [facility] - Result       Test received-See reflex to [facility] [place] test SARS CoV2 (COVID-19) Virus RT-PCR   Acetaminophen level     Status: Abnormal   Result Value Ref Range    Acetaminophen Level <2 (L) 10 - 20 mg/L   Salicylate level     Status: None   Result Value Ref Range    Salicylate Level 2 mg/dL   SARS-CoV-2 COVID-19 Virus (Coronavirus) RT-PCR Nasopharyngeal     Status: None    Specimen: Nasopharyngeal   Result  "Value Ref Range    SARS-CoV-2 Virus Specimen Source Nasopharyngeal     SARS-CoV-2 PCR Result NEGATIVE     SARS-CoV-2 PCR Comment       Testing was performed using the Xpert Xpress SARS-CoV-2 Assay on the Cepheid Gene-Xpert   Instrument Systems. Additional information about this Emergency Use Authorization (EUA)   assay can be found via the Lab Guide.            Assessment/Impression: This is a 75 year old yo female who purchased a gun last week when her depression increased \"in case my depression got worse then I could use it\". The gun was loaded when EMS arrived and she had overdosed on klonopin. She is very hopeless that she will not find treatment for depression as many medications have failed. She is irritable, has significant insomnia and anxious thoughts keeping her from sleeping for days at a time. Her diagnosis could meet mdd with anxious distress vs bipolar type 2 mixed episode. Antidepressants do not seem to help much and she has tried neuroleptics which have caused tardive dyskinesia though has not tried mood stabilizers. Lithium and lamictal may both be options. Cytomel has also not been tried which could be of benefit. Her increased amotivation and anhedonia do appear to coincide with her ingrezza which was started two years ago. Her psychiatrist has already called our unit concerned about her and I did return his call this evening and left a message.   Educated regarding medication indications, risks, benefits, side effects, contraindications and possible interactions. Verbally expressed understanding.     DX:      Mdd, recurrent, severe with anxious distress  R/o bipolar 2 mixed episode  Sleep apnea requires cpap though does not use            Labs Needed:      Results for orders placed or performed during the hospital encounter of 06/22/20   CBC with platelets differential     Status: None   Result Value Ref Range    WBC 5.7 4.0 - 11.0 10e9/L    RBC Count 3.92 3.8 - 5.2 10e12/L    Hemoglobin 12.1 " 11.7 - 15.7 g/dL    Hematocrit 37.1 35.0 - 47.0 %    MCV 95 78 - 100 fl    MCH 30.9 26.5 - 33.0 pg    MCHC 32.6 31.5 - 36.5 g/dL    RDW 12.9 10.0 - 15.0 %    Platelet Count 284 150 - 450 10e9/L    Diff Method Automated Method     % Neutrophils 44.0 %    % Lymphocytes 41.6 %    % Monocytes 10.0 %    % Eosinophils 2.8 %    % Basophils 1.2 %    % Immature Granulocytes 0.4 %    Nucleated RBCs 0 0 /100    Absolute Neutrophil 2.5 1.6 - 8.3 10e9/L    Absolute Lymphocytes 2.4 0.8 - 5.3 10e9/L    Absolute Monocytes 0.6 0.0 - 1.3 10e9/L    Absolute Eosinophils 0.2 0.0 - 0.7 10e9/L    Absolute Basophils 0.1 0.0 - 0.2 10e9/L    Abs Immature Granulocytes 0.0 0 - 0.4 10e9/L    Absolute Nucleated RBC 0.0    Comprehensive metabolic panel     Status: Abnormal   Result Value Ref Range    Sodium 142 133 - 144 mmol/L    Potassium 4.5 3.4 - 5.3 mmol/L    Chloride 111 (H) 94 - 109 mmol/L    Carbon Dioxide 27 20 - 32 mmol/L    Anion Gap 4 3 - 14 mmol/L    Glucose 90 70 - 99 mg/dL    Urea Nitrogen 14 7 - 30 mg/dL    Creatinine 1.02 0.52 - 1.04 mg/dL    GFR Estimate 53 (L) >60 mL/min/[1.73_m2]    GFR Estimate If Black 62 >60 mL/min/[1.73_m2]    Calcium 8.8 8.5 - 10.1 mg/dL    Bilirubin Total 0.2 0.2 - 1.3 mg/dL    Albumin 3.4 3.4 - 5.0 g/dL    Protein Total 6.6 (L) 6.8 - 8.8 g/dL    Alkaline Phosphatase 41 40 - 150 U/L    ALT 21 0 - 50 U/L    AST 15 0 - 45 U/L   Asymptomatic COVID-19 Virus (Coronavirus) by PCR     Status: None    Specimen: Nasopharyngeal   Result Value Ref Range    COVID-19 Virus PCR to [facility] - Source Nasopharyngeal     COVID-19 Virus PCR to [facility] - Result       Test received-See reflex to [facility] [place] test SARS CoV2 (COVID-19) Virus RT-PCR   Acetaminophen level     Status: Abnormal   Result Value Ref Range    Acetaminophen Level <2 (L) 10 - 20 mg/L   Salicylate level     Status: None   Result Value Ref Range    Salicylate Level 2 mg/dL       Plan:     Med Changes:    Start cytomel 10 mcg    Discussed starting lithium  vs lamictal or both then tapering lithium once lamictal is therapuetic    Start trintillex as she was suppose to start this medication this week from her outpatient psychiatirst    Considering e kervin patch though will speak with pharmacy and review dietary restrictions    Possibly refer for ECT        DC Planning:        Anticipated length of stay greater than 5 days. Filling out petition for commitment due to lethality and she is already requesting to leave

## 2020-06-22 NOTE — PROGRESS NOTES
Call from Silvia from  poison control  There should be no concerns for respiratory depression at this time.   Wait for body to metabolize the clonazepam

## 2020-06-22 NOTE — PLAN OF CARE
Social Service Psychosocial Assessment  Presenting Problem:    Alycia France is a 75 year old female that was admitted suicidal gesture and took 8 Klonopin, also states that she wanted to shoot herself in the head and has a loaded shotgun in her home.  Marital Status:  Patient stated she is   Spouse / Children:    no kids  Psychiatric TX HX:  Patient stated she has had depression. Denies prior hospitalizations.   Suicide Risk Assessment:  Patient was SI for admit with a plan to overdose and shoot herself. Patient denies past SI and denies SI today  Access to Lethal Means (explain):   Patient had a loaded gun in her home.   Family Psych HX:   Patient denies family MH  A & Ox:   X3  Medication Adherence:   See H&P  Medical Issues:   Patient stated she found out she had a kidney infection a week ago.   Visual -Motor Functioning:   Good, no issues noticed.   Communication Skills /Needs: Patient answered questions but her responses were slow and delayed.   Ethnicity:   White     Spirituality/Baptism Affiliation:   Orthodoxy   Clergy Request:   No   History:   NO  Living Situation:  Lives alone  ADL s: Independently   Education: Patient stated she graduated high School and earned a bachelors in [employer] and Neurologixing.   Financial Situation:   Patient stated she receives Social Security and savings  Occupation:  Unemployed  Leisure & Recreation: Gardening and spending time with her cats.   Childhood History:  Patient [place].   Trauma Abuse HX:  Patient denies trauma or abuse.   Relationship / Sexuality: Ex    Substance Use/ Abuse:   Patient denies substance use  Chemical Dependency Treatment HX:   Patient denies CD tx hx   Legal Issues:   Patient denies having legal issues  Significant Life Events:  Divorce, graduating high school and collegs  Strengths:  Educated, has a home, has support form friends, is open to services.   Challenges  /Limitation:  Current MH and SI,   Patient Support Contact (Include name, relationship, number, and summary of conversation):  RUFINA signed for her friends   Interventions:      Community-Based Programs - Would benefit     Medical/Dental Care Adalgisa Foote MD    Medication Management - would benefit     Individual Therapy - Would benefit     Housing/Placement - lives in own home     Case Management - might benefit     Insurance Coverage - had medicare and generic commerical    Financial Assistance - might benefit     Commit - ???    Suicide Risk Assessment  Patient was SI for admit with a plan to overdose and shoot herself. Patient denies past SI and denies SI today    High Risk Safety Plan Talk to supports; Call crisis lines; Go to local ER if feeling suicidal.  ROYAL George  6/22/2020  9:04 AM

## 2020-06-22 NOTE — ED NOTES
Pt ambulatory to room 8 of the ED escorted by police and EMS. [place] and has been feeling alone and isolated with depression. Today pt had thoughts and plans to end her life and took 6 - 1 mg tabs of klonopin in attempts to kill herself along with a loaded gun at her side that she did not use because she had made a phone call to a friend who said he would be coming over to help her but instead he sent the . Pt cooperative and answering all questions appropriately, but is sleepy. Pt changed into hospital scrubs. Informed pt that she is on an [place] and security is at bedside. Ice water given. MD in to see pt.

## 2020-06-22 NOTE — PLAN OF CARE
"  Problem: Adult Behavioral Health Plan of Care  Goal: Patient-Specific Goal (Individualization)  Description: Pt will follow recommendations of treatment team.  Pt will be compliant with medications.  Pt will attend 50 % of groups.  Pt will sleep 6- 8 hours each night.   6/22/2020 1447 by Alisha Martinez RN  Outcome: No Change  Note: Patient continues to have 1:1 staff present for safety r/t fall risk.  Patient is fixated on speaking with provider again today. \"I need to know the step to take to get out of here.  Step 1, then 2, the 3.\"  Her affect is flat and she appears depressed.  Patient is dismissive with assessment questions.  Patient's friend Scar called and is willing to drive from the [facility] to take care if her cats.  \"Well it's that nice, the son of a bitch.\"  Patient is   Problem: Adult Behavioral Health Plan of Care  Goal: Patient-Specific Goal (Individualization)  Description: Pt will follow recommendations of treatment team.  Pt will be compliant with medications.  Pt will attend 50 % of groups.  Pt will sleep 6- 8 hours each night.   6/22/2020 1447 by Alisha Martinez, RN  Outcome: No Change  Note: Patient continues to have 1:1 staff present for safety r/t fall risk.  Patient is fixated on speaking with provider again today. \"I need to know the step to take to get out of here.  Step 1, then 2, the 3.\"  Her affect is flat and she appears depressed.  Patient is dismissive with assessment questions.  Patient's friend Scar called and is willing to drive from the [facility] to take care if her cats.  \"Well it's that nice, the son of a bitch.\"  Patient is sarcastic and brief with responses.   Verified patient's Ingrezza Rx with [facility]:   Patient's friend Scar called again.  He was able to check on patient's cats and her house.     Ingrezza 80 mg po daily has been filled there since November of 2018.        Problem: Suicide Risk  Goal: Absence of " Self-Harm  Description: Pt will remain free from self harm/injury during hospitalization.  Pt will have a decrease in suicidal thoughts during hospitalization.    6/22/2020 1448 by Alisha Martinez, RN  Outcome: No Change  Note: Patient had no noted self harm this shift.      Problem: Fall Injury Risk  Goal: Absence of Fall and Fall-Related Injury  Description: Pt will remain free from fall during hospitalization.  Fall band to remain in place.  Pt will wear appropriate footwear.  1:1 within arm reach, gait belt for transfers, staff assist with ADL's   6/22/2020 1448 by Alisha Martinez, RN  Outcome: Improving  Note: 1:1 staff present for safety r/t fall risk.  Gait is somewhat unsteady.  Gait belt uted for transfers and ambulation.    She had no noted falls this shift.

## 2020-06-22 NOTE — PROGRESS NOTES
06/22/20 0359   Patient Belongings   Did you bring any home meds/supplements to the hospital?  Yes   Disposition of meds  Sent to security/pharmacy per site process   Patient Belongings remains with patient;locker   Patient Belongings Remaining with Patient glasses   Patient Belongings Put in Hospital Secure Location (Security or Locker, etc.) cash/credit card;cell phone/electronics;earrings;money (see comment);plastic bag   Belongings Search Yes   Clothing Search Yes   Second Staff Adalgisa   Comment brush, glasses, comb, cup, cheekers, pressed power, 2 pack of cigarettes, sunglasses, 7 writing pens, flash light, 2 tire presser, 2 lighters, 3 smoking pens, lip balm, bag of candy, Silver color case, dry mouth (moisturizing spray), vehicle protection, insurance card, prescription card, 2 medicare card, car insurance, check book, Cellphone no cracks, 4 set of keys, $140 in 20s $40 in 10s $5 in 5s and $27 in 1s 2.38 in change (214.38), [place]  license, ID card, gold and silver colored watch, 2 books, notepad   ..List items sent to safe: vehicle protection, insurance card, prescription card, 2 medicare card, car insurance, check book, Cellphone no cracks, 4 set of keys, $140 in 20s $40 in 10s $5 in 5s and $27 in 1s 2.38 in change (214.38), [place]  license, ID card, gold and silver colored watch  All other belongings put in assigned cubby in belongings room.       I have reviewed my belongings list on admission and verify that it is correct.     Patient signature_______________________________    Second staff witness (if patient unable to sign) ______________________________       I have received all my belongings at discharge.    Patient signature________________________________    Leo  6/22/2020  4:23 AM

## 2020-06-22 NOTE — ED PROVIDER NOTES
History     Chief Complaint   Patient presents with     Suicidal     HPI  Alycia France is a 75 year old female who has a past medical surgical history of depression anxiety COPD DHD tardive dyskinesia here with suicidal gesture.  Took 8 Klonopin, also states that she wanted to shoot herself in the head and has a loaded shotgun in her home.  That was confirmed by EMS.    Allergies:  No Known Allergies    Problem List:    Patient Active Problem List    Diagnosis Date Noted     Suicidal ideation 2019     Priority: Medium     ACP (advance care planning) 2016     Priority: Medium     Patient refused information       Oral lesion 2013     Priority: Medium        Past Medical History:    Past Medical History:   Diagnosis Date     ADD (attention deficit disorder)      Anxiety      COPD (chronic obstructive pulmonary disease) (H)      Depression      Tardive dyskinesia        Past Surgical History:    Past Surgical History:   Procedure Laterality Date     APPENDECTOMY       CHOLECYSTECTOMY       SIGMOIDOSCOPY RIGID       thyroid resection, benign tumor         Family History:    Family History   Problem Relation Age of Onset     Cancer Brother         x 2     Cancer Mother         throat     Depression Sister      Depression Father        Social History:  Marital Status:   [4]  Social History     Tobacco Use     Smoking status: Current Some Day Smoker     Packs/day: 1.00     Types: Cigarettes     Last attempt to quit: 10/13/2012     Years since quittin.6     Smokeless tobacco: Never Used   Substance Use Topics     Alcohol use: No     Alcohol/week: 0.0 standard drinks     Drug use: No        Medications:    amphetamine-dextroamphetamine (ADDERALL) 20 MG tablet  clonazePAM (KLONOPIN) 1 MG tablet  eszopiclone (LUNESTA) 3 MG tablet  gabapentin (NEURONTIN) 100 MG capsule  LORazepam (ATIVAN) 1 MG tablet  solifenacin (VESICARE) 10 MG tablet  valbenazine (INGREZZA) 40 MG capsule  hydrOXYzine (ATARAX)  "25 MG tablet          Review of Systems   Constitutional: Negative for fatigue and fever.   Respiratory: Negative for cough and shortness of breath.    All other systems reviewed and are negative.      Physical Exam   BP: 112/66  Pulse: 66  Temp: 96.7  F (35.9  C)  Resp: 20  Height: 175.3 cm (5' 9\")  Weight: 74.8 kg (165 lb)  SpO2: 97 %      Physical Exam  Constitutional:       General: She is not in acute distress.     Appearance: She is not diaphoretic.   HENT:      Head: Atraumatic.      Mouth/Throat:      Pharynx: No oropharyngeal exudate.   Eyes:      General: No scleral icterus.     Pupils: Pupils are equal, round, and reactive to light.   Cardiovascular:      Heart sounds: Normal heart sounds.   Pulmonary:      Effort: No respiratory distress.      Breath sounds: Normal breath sounds.   Abdominal:      General: Bowel sounds are normal.      Palpations: Abdomen is soft.      Tenderness: There is no abdominal tenderness.   Musculoskeletal:         General: No tenderness.   Skin:     General: Skin is warm.      Capillary Refill: Capillary refill takes less than 2 seconds.      Findings: No rash.   Neurological:      General: No focal deficit present.      Mental Status: She is oriented to person, place, and time.   Psychiatric:      Comments: Depressed mood, actively suicidal         ED Course        Procedures               Critical Care time:  none               No results found for this or any previous visit (from the past 24 hour(s)).    Medications - No data to display    Assessments & Plan (with Medical Decision Making)     I have reviewed the nursing notes.    I have reviewed the findings, diagnosis, plan and need for follow up with the patient.   75-year-old female here with suicidal gesture, I have a high suspicion that patient would act on this if she were to go home, will require admission for psychiatric care. Has a UTI, will continue her ciprofloxacin while here.    New Prescriptions    No " medications on file       Final diagnoses:   Suicide attempt by inadequate means, initial encounter (H)       6/22/2020   [facility]     Roderick Henriquez MD  06/22/20 0057

## 2020-06-22 NOTE — H&P
"[facility]    History and Physical  Medical Services       Date of Admission:  6/22/2020  Date of Service (when I saw the patient): 06/22/20    Assessment & Plan     Principal Problem:    Suicide attempt (H)    Active Medical Problems:    Chronic obstructive pulmonary disease with acute exacerbation (H)- pt not a good historian. Pt denies history of COPD, denies taking any medications for it. Pt denies chest pian, sob, difficulty breathing.       OBDULIO (obstructive sleep apnea)- note in care everywhere 12/17/19 from pulmonary states pt is suppose to be on a CPAP for OBDULIO. Pt states \"it doesn't help, so I don't use it.\" Pt states she doesn't want anyone to bring it by the hospital so she can use it during her admission.       Overactive bladder- pt reports she takes vesicare daily. Pt could not recall dose.     Pt reports she is on cipro for UTI. Doesn't recall exact date when she started it. Once Med scribe verifies will order remaining doses.     covid screening- pending     Code Status: Full Code    Blessing Skinner CNP    Primary Care Physician   Adalgisa Foote    Chief Complaint   Psych evaluation    History is obtained from the patient and medical chart     History of Present Illness   (per ED) Alycia France is a 75 year old female who has a past medical surgical history of depression anxiety COPD DHD tardive dyskinesia here with suicidal gesture.  Took 8 Klonopin, also states that she wanted to shoot herself in the head and has a loaded shotgun in her home.  That was confirmed by EMS.    Past Medical History    I have reviewed this patient's medical history and updated it with pertinent information if needed.   Past Medical History:   Diagnosis Date     ACP (advance care planning) 12/28/2016    Patient refused information      ADD (attention deficit disorder)      Anxiety      Chronic obstructive pulmonary disease with acute exacerbation (H) 6/22/2020     COPD (chronic obstructive pulmonary disease) " (H)      Depression      Insomnia 5/8/2014     Major depressive disorder, recurrent episode, moderate (H) 2/24/2014     Oral lesion 11/21/2013     OBDULIO (obstructive sleep apnea) 6/22/2020     Overactive bladder 6/22/2020     Suicidal ideation 9/13/2019     Suicide attempt (H) 6/22/2020     Tardive dyskinesia      Urge urinary incontinence 12/15/2014       Past Surgical History   I have reviewed this patient's surgical history and updated it with pertinent information if needed.  Past Surgical History:   Procedure Laterality Date     APPENDECTOMY       CHOLECYSTECTOMY       SIGMOIDOSCOPY RIGID       thyroid resection, benign tumor         Prior to Admission Medications   Prior to Admission Medications   Prescriptions Last Dose Informant Patient Reported? Taking?   ciprofloxacin (CIPRO) 500 MG tablet 6/21/2020 at Unknown time  Yes Yes   Sig: Take 500 mg by mouth 2 times daily   clonazePAM (KLONOPIN) 1 MG tablet 6/21/2020 at Unknown time  Yes Yes   Sig: Take 1 mg by mouth 2 times daily as needed for anxiety   eszopiclone (LUNESTA) 3 MG tablet Unknown at Unknown time  Yes No   Sig: Take 3 mg by mouth At Bedtime   gabapentin (NEURONTIN) 100 MG capsule 6/21/2020 at Unknown time Pharmacy Yes Yes   Sig: Take 100 mg by mouth At Bedtime   hydrOXYzine (ATARAX) 25 MG tablet More than a month at Unknown time  No No   Sig: Take 1 tablet (25 mg) by mouth 3 times daily as needed for anxiety   solifenacin (VESICARE) 10 MG tablet 6/21/2020 at Unknown time Pharmacy Yes Yes   Sig: Take 10 mg by mouth daily   valbenazine (INGREZZA) 40 MG capsule 6/21/2020 at Unknown time Self Yes Yes   Sig: Take 40 mg by mouth daily   venlafaxine (EFFEXOR) 75 MG tablet 6/21/2020  Yes Yes   Sig: Take 75 mg by mouth At Bedtime      Facility-Administered Medications: None     Allergies   No Known Allergies    Social History   I have reviewed this patient's social history and updated it with pertinent information if needed. Alycia France  reports that she has  been smoking cigarettes. She has been smoking about 1.00 pack per day. She has never used smokeless tobacco. She reports that she does not drink alcohol or use drugs.    Family History   I have reviewed this patient's family history and updated it with pertinent information if needed.   Family History   Problem Relation Age of Onset     Cancer Brother         x 2     Cancer Mother         throat     Depression Sister      Depression Father        Review of Systems   CONSTITUTIONAL:  negative  EYES:  negative  HEENT:  negative  RESPIRATORY:  negative  CARDIOVASCULAR:  negative  GASTROINTESTINAL:  negative  GENITOURINARY:  negative  INTEGUMENT/BREAST:  negative  HEMATOLOGIC/LYMPHATIC:  negative  ALLERGIC/IMMUNOLOGIC:  negative  ENDOCRINE:  negative  MUSCULOSKELETAL:  negative  NEUROLOGICAL:  negative    Physical Exam   Temp: 97.6  F (36.4  C) Temp src: Tympanic BP: 111/67 Pulse: 66   Resp: 16 SpO2: 94 % O2 Device: None (Room air)    Vital Signs with Ranges  Temp:  [95.5  F (35.3  C)-97.6  F (36.4  C)] 97.6  F (36.4  C)  Pulse:  [66] 66  Resp:  [16-20] 16  BP: (111-127)/(66-74) 111/67  SpO2:  [94 %-97 %] 94 %  165 lbs 0 oz    Constitutional: NAD, disheveled, appears well- nourished, well-developed   HEENT: atraumatic, normocephalic, PERRLA, bilateral ears normal, nose normal, oropharynx normal, no thyromegaly, no lymphadenopathy   Respiratory: CTA bilaterally, no rales, no rhonchi, no wheezes, no crackles, symmetric rise and fall of chest, good effort   Cardiovascular: RRR, S1, S2, no extra heart sounds, no murmurs, no edema   GI: normal inspection, normoactive bowel sounds x 4, no tenderness, no guarding, no masses, no organomegaly   Lymph/Hematologic: no lymphadenopathy   Genitourinary: deferred   Skin: warm, dry, intact, no rashes, no open wounds, no cyanosis   Musculoskeletal: FROM, gait not assessed   Neurologic:  Alert   Psychiatric: calm, cooperative, difficulty to interview and illicit responses from pt.       Data   Data reviewed today:   Recent Labs   Lab 06/22/20  0123   WBC 5.7   HGB 12.1   MCV 95         POTASSIUM 4.5   CHLORIDE 111*   CO2 27   BUN 14   CR 1.02   ANIONGAP 4   ANI 8.8   GLC 90   ALBUMIN 3.4   PROTTOTAL 6.6*   BILITOTAL 0.2   ALKPHOS 41   ALT 21   AST 15       No results found for this or any previous visit (from the past 24 hour(s)).
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
## 2020-06-23 NOTE — PLAN OF CARE
"Problem: Adult Behavioral Health Plan of Care  Goal: Patient-Specific Goal (Individualization)  Description: Pt will follow recommendations of treatment team.  Pt will be compliant with medications.  Pt will attend 50 % of groups.  Pt will sleep 6- 8 hours each night.   6/22/2020 2249 by Ramila Bal, RN  Outcome: No Change    Pt continues to have 1:1 staff at bedside for safety when up out of bed. Remains unsteady, noted to have slurred speech at times, otherwise has been pleasant and cooperative. Denied having suicidal thoughts today, was read her 72 hour hold rights, and does understand the serious nature of her SA. Pt described her stressors and how overwhelmed she has become. Did repeatedly but calmly ask \"What do I have to do to get out?\" Requested shower supplies and showered using shower bench for safety. Ate 100% of dinner meal, walked in hallway with SBA and gait belt. Not yet attending groups. Pt did request sleep med and was given PRN Melatonin. No falls or other injury on shift .   Provider April notified at start of shift that Dr. Harp had called to assist with any information needed, number provided for reference.     2980 - Face to face end of shift report communicated to oncoming shift RN.     Ramila Bal, RN  6/22/2020  11:26 PM           "
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
## 2020-06-23 NOTE — PLAN OF CARE
Face to face end of shift report will be communicated to oncoming RN.    Problem: Adult Behavioral Health Plan of Care  Goal: Patient-Specific Goal (Individualization)  Description: Pt will follow recommendations of treatment team.  Pt will be compliant with medications.  Pt will attend 50 % of groups.  Pt will sleep 6- 8 hours each night.   6/23/2020 0038 by Gely Gómez RN  Outcome: No Change     Problem: Suicide Risk  Goal: Absence of Self-Harm  Description: Pt will remain free from self harm/injury during hospitalization.  Pt will have a decrease in suicidal thoughts during hospitalization.    6/23/2020 0038 by Gely Gómez RN  Outcome: No Change     Problem: Fall Injury Risk  Goal: Absence of Fall and Fall-Related Injury  Description: Pt will remain free from fall during hospitalization.  Fall band to remain in place.  Pt will wear appropriate footwear.  1:1 within arm reach, gait belt for transfers, staff assist with ADL's   6/23/2020 0038 by Gely Gómez RN  Outcome: Improving    Face to face end of shift report obtained from TOMMY Mcgrath. Pt observed resting in bed. 1:1 staff with patient.  Pt appears to be sleeping in bed with eyes closed, having regular respirations, and position changes. 15 minutes and PRN safety checks completed with no noted complains. No self harm or falls noted or reported so far this shift. Will continue to monitor.   0600- Pt assisted to bathroom once this shift. Pt continues to be unsteady. Pt slept 7 hours.  0640- O2 sat 90 % R 14. Pt very sleepy and slightly drowsy. Pt did respond with verbal commends at first time when her name was called. Denies difficulty breathing. Pt able to move legs when asked to. Sticky note left to provider. Will continue to monitor.

## 2020-06-23 NOTE — PLAN OF CARE
Prior to Admission Medication Reconciliation:     Medications added:   [] None  [x] As listed below:    trintellix- looks like patient was switched from effexor to trintellix this month     Phenazopyridine- pt recovering from UTI, may be complete with this med    Vit d 3 1000 units, take 2000 units daily- per [facility] and [facility] records    Medications deleted:   [] None  [x] As listed below:    effexor- switched to trintellix    Changes made to existing medications:   [x] None  [] As listed below:      Last times/dates taken verified with patient:  [] Yes- completed myself  [x] Nurse completed no changes made  [] Unable to review with patient:  [] Nurse completed/changes made:       Allergy modifications:    [x]Did not review  []Patient verified NKA  []Patient verified current existing allergies: no changes made  []New allergies: listed below      Medication reconciliation sources:   []Patient  []Patient family member/emergency contact: **  [x][facility] Report Review:  Home Medications     - Last Reconciled 06/15/20 by Yulissa Kiran CMA    cholecalciferol (vitamin D3) 25 mcg (1,000 unit) capsule 1,000 units PO DAILY   clonazepam 0.5 mg tablet 0.5 - 1 mg (1 - 2 x 0.5 mg) PO BID   dextroamphetamine-amphetamine 20 mg tablet  (Adderall) 40 mg (2 x 20 mg) PO DAILY   eszopiclone 3 mg tablet 3 mg PO BEDTIME PRN   gabapentin 100 mg capsule 100 mg PO BEDTIME   solifenacin 10 mg tablet (Vesicare) 10 mg PO DAILY   valbenazine 40 mg capsule 40 mg PO DAILY   ciprofloxacin HCl 500 mg PO BID 10 days 20 tabs 0RF    phenazopyridine (Pyridium) 100 mg PO TID 6 doses PRN 6 tabs 0RF koby  []Epic Chart Review  []Care Everywhere review  [x]Pharmacy med list: [facility]   Name Strength Instructions Last Fill Date QTY/DS Notes   [x] Amphetamine salts  20 mg 2 tabs qam  6/17/20 60/30    [x] trintellix 5 mg 1/2 tab x 6 days, then 1 tab daily 6/17/20 30    [x] clonazepam 1 mg Take 1/2-1 tab BID 6/15/20 30    [x] phenazopyridine  100 mg TID PRN 6 doses 6/15/20 6 tabs    [] ciprofloxacin 500 mg BID x 10 days 6/15/20 20    [] Venlafaxine er 75 mg 1 capsule qam x 1 week, then 1 ca BID 20 60    [] venlafxaine er 37.5 mg 1 capsule at bedtime x 1 week then start 75 mg at night 20 7    [x] Gabapentin  100 mg At bedtime  20 90    [x] eszopiclone 3 mg 1 tab at bedtime prn 20 30    [x] hyroxyzine 25 mg 1 tab q8h prn anxiety 20 90    [x] solifenacin succ 10 mg 1 tab every day  20 90       []Pharmacy phone call  []Outside meds dispense report  []Nursing home or Assisted Living MAR:  [x]Other: [facility] Active meds list    Dextro-amphetamine 20 m tabs qam    Clonazepam 0.5 mg- 1 tab per day as a standing order and one additional tab per day as needed for anxiety    Clonazepam 1 mg- 1 tab every day for anxiety (with the 0.5 mg tab)    trintellix 5 mg 1/2 tab x 6 days, then 1 tab every day    Venlafaxine er 75 mg capsule 24 hour D/C?    ingrezza 80 mg capsule once daily    eszopiclone 3 mg at bedtime prn    Alpha stim    Vit D3 1000 unit capsules 2 capsules daily    Pharmacy desired at discharge: [facility]    Is patient on coumadin?  [x]No      Requests for consultation by provider or pharmacist:   [] Patient understands why all of their meds were prescribed and how to take them. No questions.   [x] Fill dates coincide with compliancy for all maintenance meds.   [] Fill dates do not coincide with compliancy with maintenance meds. See notes in PTA medlist about how patient is taking.   [] Patient has questions about the following:      Comments: PTA medlist ready to review with patient. (Ingrezza verified by nurse.)      Valeria Urias on 2020 at 7:40 AM       Discrepancies: [x] No []Not Applicable []Yes: listed below      Time spent on medication reconciliation:   []5-20 mins  [x]20-40 mins  []> 40 mins    Issues completing PTA medication reconciliation:  [] On hold for a long time  [] Waited for a call back  []  Fax didn't come through  [] Fax took a long time  [] Other:    Notifying appropriate party of changes/additions/discrepancies:  []No pertinent changes made, notification not necessary.   [] Notified attending provider via text page  [] Notified attending provider in person  [] Notified pharmacy  [x] Notified nurse  [] Attending provider not available, left detailed notes  [] Changes/additions made don't need provider notification because provider has not seen patient or input any orders  [] Changes/additions made don't need provider notification because changes made are to medications not ordered  Medications Prior to Admission   Medication Sig Dispense Refill Last Dose     amphetamine-dextroamphetamine (ADDERALL) 20 MG tablet Take 40 mg by mouth every morning         ciprofloxacin (CIPRO) 500 MG tablet Take 500 mg by mouth 2 times daily for 10 days.   6/21/2020 at Unknown time     clonazePAM (KLONOPIN) 1 MG tablet Take 0.5-1 mg by mouth 2 times daily         gabapentin (NEURONTIN) 100 MG capsule Take 100 mg by mouth At Bedtime   6/21/2020 at Unknown time     phenazopyridine (PYRIDIUM) 100 MG tablet Take 100 mg by mouth 3 times daily as needed for urinary tract discomfort for 6 doses.        valbenazine (INGREZZA) 40 MG capsule Take 80 mg by mouth daily    6/21/2020 at Unknown time     vortioxetine (TRINTELLIX) 5 MG tablet Take 5 mg by mouth daily (Start with 1/2 tab for 6 days and then increase to 1 full tablet.)        eszopiclone (LUNESTA) 3 MG tablet Take 3 mg by mouth At Bedtime   Unknown at Unknown time     hydrOXYzine (ATARAX) 25 MG tablet Take 1 tablet (25 mg) by mouth 3 times daily as needed for anxiety 90 tablet 0 More than a month at Unknown time     solifenacin (VESICARE) 10 MG tablet Take 10 mg by mouth daily   More than a month at Unknown time

## 2020-06-23 NOTE — PLAN OF CARE
"  Problem: Adult Behavioral Health Plan of Care  Goal: Patient-Specific Goal (Individualization)  Description: Pt will follow recommendations of treatment team.  Pt will be compliant with medications.  Pt will attend 50 % of groups.  Pt will sleep 6- 8 hours each night.   6/23/2020 0921 by Alisha Martinez, RN  Outcome: No Change  Note: Patient c/o feeling \"slow\" and \"stiff\" when walking.  She is unsure if this is r/t not taking/being prescribed Ingrezza. \"I've always taken it so I don't know.\"  No cogwheeling noted.  Patient is isolative was in bed most of the day yesterday.  Encouraged patient to walk and sit up in chair. She walked out to the lounge for breakfast.  Cooperative with medications.  Patient continues to have 1:1 staff present for safety r/t fall risk.  Gait belt utilized for transfers and ambulation.  Gait unsteady at times.         Problem: Suicide Risk  Goal: Absence of Self-Harm  Description: Pt will remain free from self harm/injury during hospitalization.  Pt will have a decrease in suicidal thoughts during hospitalization.    6/23/2020 0921 by Alisha Martinez, RN  Outcome: No Change  Note: Patient had no noted self harm this shift.       Problem: Fall Injury Risk  Goal: Absence of Fall and Fall-Related Injury  Description: Pt will remain free from fall during hospitalization.  Fall band to remain in place.  Pt will wear appropriate footwear.  1:1 within arm reach, gait belt for transfers, staff assist with ADL's   6/23/2020 0921 by Alisha Martinez, RN  Outcome: Improving  Note: Patient's gait is unsteady at times. Wearing non skid footwear.  C/o feeling \"slow\" and \"stiff\" during ambulation.  \"I feel like my legs weigh a thousand pounds\". 1:1 staff present for patient safety r/t unsteady gait at times.    Will continue to monitor.       "

## 2020-06-23 NOTE — PROGRESS NOTES
"[facility]    Medical Services Progress Note    Date of Service (when I saw the patient): 06/23/2020    Assessment & Plan     Principal Problem:    Suicide attempt (H)     Active Medical Problems:    Chronic obstructive pulmonary disease with acute exacerbation (H)- pt not a good historian. Pt denies history of COPD, denies taking any medications for it. Pt denies chest pian, sob, difficulty breathing.   6/23/20- pt denies chest pain, sob, difficulty breathing.        OBDULIO (obstructive sleep apnea)- note in care everywhere 12/17/19 from pulmonary states pt is suppose to be on a CPAP for OBDULIO. Pt states \"it doesn't help, so I don't use it.\" Pt states she doesn't want anyone to bring it by the hospital so she can use it during her admission.        Overactive bladder- pt reports she takes vesicare daily. Pt could not recall dose.   6/23/20- pt takes vesicare 10 mg daily at home, pharmacy formulary substitute equialency is Detrol LA 4 mg.      UTI- pt started on cipro according to fill date from pharmacy on 6/15/20 for 10 days. Pt will finish antibiotic on 6/25/20. Pt denies any urinary symptoms. Will repeat UA after completion of antibiotic.      covid screening- negative      Code Status: Prior.    Blessing Skinner CNP        -Data reviewed today: I reviewed all new labs and imaging results over the last 24 hours.     Physical Exam   Temp: 97.1  F (36.2  C) Temp src: Tympanic BP: 104/62(Manually righ arm) Pulse: 74   Resp: 14 SpO2: 94 % O2 Device: None (Room air)    Vitals:    06/22/20 0047   Weight: 74.8 kg (165 lb)     Vital Signs with Ranges  Temp:  [97.1  F (36.2  C)-98.1  F (36.7  C)] 97.1  F (36.2  C)  Pulse:  [60-74] 74  Resp:  [12-14] 14  BP: ()/(53-63) 104/62  SpO2:  [89 %-97 %] 94 %  No intake/output data recorded.    Constitutional: NAD, vitals stable, appears well  Respiratory: CTA bilaterally, no rales, no rhonchi, no wheezes, no crackles, symmetric rise and fall of chest, good effort "   Cardiovascular: RRR, S1, S2, no extra heart sounds, no murmurs, no edema   GI: normal inspection, normoactive bowel sounds x 4, no tenderness, no guarding, no masses, no organomegaly   Skin/Integumen: warm, dry, intact, no open wounds, no rashes, no cyanosis       Medications     ciprofloxacin  500 mg Oral BID     gabapentin  100 mg Oral At Bedtime     lactobacillus rhamnosus (GG)  1 capsule Oral BID     liothyronine  10 mcg Oral Daily     PHENobarbital  32.4 mg Oral BID     tolterodine ER  4 mg Oral Daily     vortioxetine  5 mg Oral Daily       Data   Recent Labs   Lab 06/22/20  0123   WBC 5.7   HGB 12.1   MCV 95         POTASSIUM 4.5   CHLORIDE 111*   CO2 27   BUN 14   CR 1.02   ANIONGAP 4   ANI 8.8   GLC 90   ALBUMIN 3.4   PROTTOTAL 6.6*   BILITOTAL 0.2   ALKPHOS 41   ALT 21   AST 15       No results found for this or any previous visit (from the past 24 hour(s)).

## 2020-06-23 NOTE — PHARMACY
[facility]    Pharmacy      Antimicrobial Stewardship Note     Current antimicrobial therapy:  Anti-infectives (From now, onward)    Start     Dose/Rate Route Frequency Ordered Stop    06/22/20 1300  ciprofloxacin (CIPRO) tablet 500 mg      500 mg Oral 2 TIMES DAILY 06/22/20 1242            Indication: UTI    Days of Therapy: 2 inpatient (filled at [facility] on 6/15/2020), so probably day 9     Pertinent labs:  Creatinine   Creatinine   Date Value Ref Range Status   06/22/2020 1.02 0.52 - 1.04 mg/dL Final   09/13/2019 1.01 0.52 - 1.04 mg/dL Final   05/27/2019 0.86 0.52 - 1.04 mg/dL Final     WBC   WBC   Date Value Ref Range Status   06/22/2020 5.7 4.0 - 11.0 10e9/L Final   09/13/2019 6.2 4.0 - 11.0 10e9/L Final   05/27/2019 7.8 4.0 - 11.0 10e9/L Final     Procalcitonin No results found for: PCAL  CRP No results found for: CRP    Culture Results: none     Recommendations/Interventions:  1. Currently on day 9 of treatment (assuming outpatient compliance). Add stop date/time of 3 more doses to end tomorrow evening.    Samantha Garcia [facility]  June 23, 2020

## 2020-06-24 NOTE — PROGRESS NOTES
"Behavioral Health Occupational Therapy Eval      Name: Alycia France MRN# [id number]   Age: 75 year old YOB: 1945     Date of Consultation: June 24, 2020  Primary care provider: Adalgisa Foote    Referring Physician: Amanda Powers  Orders: Eval and Treat  Medical Diagnosis: suicide attempt  Onset of Illness/Injury: 6/22/20    Prior Level of Function: Pt wad admitted due to increased depression of the the past month.  Pt did overdose on her Klonopin and called EMS.  She reported to EMS that she had been contemplating suicide and has a loaded gun, which was verified by EMS staff.  [place]  Home is handicapped accessible and has a ramp to get in the home and ramp from the home to garage.  Pt has a grab bars in her bathtub.  Does not use any AD for ambulation.  Has a cleaning lady that assists with household tasks.  Reports difficulty falling asleep at night.  Is involved with her [employer] and enjoys gardening and yardwork.      Current Level of Function: Pt is flat but participatory with OT today.  States \"I may feel suicidal at times but it doesn't mean I'm gonna do it\".  States that she is currently looking a small home or apartment to move into in [place], reported that she knows she is isolated and has limited friends and support out where she lives.  Pt is on a 1:1 for safety due to poor balance and mobility, not using any AD for ambulation, however 1:1 staff report that she is improving.  Has alcides attending groups on the unit.  Pt has been filed on for commitment, has been explained the process multiple times and by multiple staff, notes indicate they would like her to agree to sign voluntary in lieu of commitment.    Patient/Family Goal: \"I need to leave and get home tomorrow\"     Fall Screen:   Have you fallen 2 or more times in the last year? No  Have you fallen and had an injury in the last year? No  Timed up & go: not completed at this time  Is patient a fall risk? " Yes, on a 1:1 due to high fall risk and poor balance    Past Medical History:   Past Medical History:   Diagnosis Date     ACP (advance care planning) 12/28/2016    Patient refused information      ADD (attention deficit disorder)      Anxiety      Chronic obstructive pulmonary disease with acute exacerbation (H) 6/22/2020     COPD (chronic obstructive pulmonary disease) (H)      Depression      Insomnia 5/8/2014     Major depressive disorder, recurrent episode, moderate (H) 2/24/2014     Oral lesion 11/21/2013     OBDULIO (obstructive sleep apnea) 6/22/2020     Overactive bladder 6/22/2020     Suicidal ideation 9/13/2019     Suicide attempt (H) 6/22/2020     Tardive dyskinesia      Urge urinary incontinence 12/15/2014       Past Surgical History:  Past Surgical History:   Procedure Laterality Date     APPENDECTOMY       CHOLECYSTECTOMY       SIGMOIDOSCOPY RIGID       thyroid resection, benign tumor         Medications:   Current Facility-Administered Medications   Medication     acetaminophen (TYLENOL) tablet 650 mg     alum & mag hydroxide-simethicone (MAALOX  ES) suspension 30 mL     bisacodyl (DULCOLAX) Suppository 10 mg     ciprofloxacin (CIPRO) tablet 500 mg     gabapentin (NEURONTIN) capsule 100 mg     hydrOXYzine (ATARAX) tablet  mg     lactobacillus rhamnosus (GG) (CULTURELL) capsule 1 capsule     liothyronine (CYTOMEL) tablet 10 mcg     magnesium hydroxide (MILK OF MAGNESIA) suspension 30 mL     melatonin 3 mg (with vit B6 10 mg) extended release tablet 1-2 tablet     nicotine (NICORETTE) gum 2-4 mg     PHENobarbital (LUMINAL) tablet 16.2 mg     tolterodine ER (DETROL LA) 24 hr capsule 4 mg     traZODone (DESYREL) tablet 50 mg     vitamin E (TOCOPHEROL) 400 units (360 mg) capsule 400 Units     vortioxetine (TRINTELLIX/BRINTELLIX) half-tablet 2.5 mg       Reason for OT Referral:  Mental Health History: hx of inpatient psych admits in 2019 and 20 year ago. Has gone to a ketamine program, sees Dr. Harp  for therapy for many years  Signs/Symptoms of compliant: flat, confusion, fall risk  Aggravating factors/Current Life Stressors: isolated, increase in depression  Current Services: therapy    Personal Information:  Family Structure: , no children  Living Arrangement: [place]   Finances: retired  Medication Management: manages her own meds at home   Support System:friends     Physical Presentation:   Mobility: one a 1:1 for safety and balance concerns  Strength/ROM: WFL   Comfort/Pain: None reported today  Sensory: no concerns     Self Care:   Nutrition: I do okay  Sleep Pattern: I would like to improve, reports difficulty falling asleep  Exercise: I do this well, yardwork  Spiritual Practice: I do this well, actively involved at a [employer] in [place]  Leisure: I do this well, gardening  Coping Skills: I have difficulty    Cognition:  Orientation:  time, place and person  Memory: Intact  Safety awareness: Intact  Attention: Normal   Motivation: Normal   Judgement/Insight: Diminished  Speech/Language: Slowed   Mood: Neutral  Affect: Flat  Thought Content: appropriate    Goals:   Pt will verbalize understanding of 2-3 new healthy coping skills to aide in emotion regulation.  Pt will complete cognitive assessment to aide in safe discharge planning.  Pt will participate in mobility assessment to determine need for further 1:1.      Planned Interventions: mobility, cognition, coping skills    Clinical Impressions:  Criteria for Skilled Therapeutic Intervention Met: yes  OT Diagnosis: impaired cognition, poor coping skills  Influenced by the following impairments: depression, SI  Functional limitations due to impairment: home management, leisure participation  Clinical presentation: Evolving/Changing  Clinical presentation rationale: pts affect and level of care  Clinical Decision making (complexity): Low Complexity  Predicted Duration of Therapy Intervention (days/wks): 3 x week x 2  weeks  Risks and Benefits of therapy have been explained: Yes  Patient, Family & other staff in agreement with plan of care: Yes  Comments: Pt is appropriate for OT services to address coping skills, cognition and her mobility.      Total Evaluation Time: 21

## 2020-06-24 NOTE — PHARMACY
[facility]    Pharmacy      Antimicrobial Stewardship Note     Current antimicrobial therapy:  Anti-infectives (From now, onward)    Start     Dose/Rate Route Frequency Ordered Stop    06/22/20 1300  ciprofloxacin (CIPRO) tablet 500 mg      500 mg Oral 2 TIMES DAILY 06/22/20 1242 06/25/20 3248          Indication: UTI    Days of Therapy: 9 or 10 (filled 6/15/20 as outpatient)     Pertinent labs:  Creatinine   Creatinine   Date Value Ref Range Status   06/22/2020 1.02 0.52 - 1.04 mg/dL Final   09/13/2019 1.01 0.52 - 1.04 mg/dL Final   05/27/2019 0.86 0.52 - 1.04 mg/dL Final     WBC   WBC   Date Value Ref Range Status   06/22/2020 5.7 4.0 - 11.0 10e9/L Final   09/13/2019 6.2 4.0 - 11.0 10e9/L Final   05/27/2019 7.8 4.0 - 11.0 10e9/L Final     Procalcitonin No results found for: PCAL  CRP No results found for: CRP    Culture Results: none available     Recommendations/Interventions:  1. None    Bautista Thakkar, [facility]  June 24, 2020

## 2020-06-24 NOTE — PLAN OF CARE
Problem: Adult Behavioral Health Plan of Care  Goal: Patient-Specific Goal (Individualization)  Description: Pt will follow recommendations of treatment team.  Pt will be compliant with medications.  Pt will attend 50 % of groups.  Pt will sleep 6- 8 hours each night.   6/24/2020 1614 by Peg Matamoros, RN  Note: Patient up on unit off/on throughout shift. Participates in group and friendly to staff and peers. Affect appears flat and sad at times but speech is clear and appropriate. Ambulates with staff assistance, no concerns at this time. Patient denies SI, hallucinations, anxiety and pain this shift. Compliant with scheduled medications. Laying in bed resting after evening snack for remainder of shift. Continue to monitor at this time.      Problem: Suicide Risk  Goal: Absence of Self-Harm  Description: Pt will remain free from self harm/injury during hospitalization.  Pt will have a decrease in suicidal thoughts during hospitalization.    6/24/2020 1614 by Peg Matamoros, RN  Note: Continue to monitor at this time.     Face to face end of shift report communicated to oncoming RN.     Peg Matamoros, RN  6/24/2020  10:49 PM

## 2020-06-24 NOTE — PLAN OF CARE
MARIA DEL CARMEN HOGUE RN  6/24/2020  11:43 AM    Report received from TOMMY Vega. Pt brought over from [address] [place] [address] with security and 1:1. High fall risk with assist of 1 and gait belt.   Rounding completed, pt observed.  Pt oriented to unit.  1245- Pt ate her lunch in the lounge.  1:1 continues.  Cooperative with staff, very pleasant.  1442- Silva assessing pt at this time.       Problem: Adult Behavioral Health Plan of Care  Goal: Patient-Specific Goal (Individualization)  Description: Pt will follow recommendations of treatment team.  Pt will be compliant with medications.  Pt will attend 50 % of groups.  Pt will sleep 6- 8 hours each night.   6/24/2020 1156 by Maria Del Carmen Hogue, RN  Outcome: Improving     Problem: Suicide Risk  Goal: Absence of Self-Harm  Description: Pt will remain free from self harm/injury during hospitalization.  Pt will have a decrease in suicidal thoughts during hospitalization.  6/24/2020 1156 by Maria Del Carmen Hogue, RN  Outcome: Improving     Problem: Fall Injury Risk  Goal: Absence of Fall and Fall-Related Injury  Description: Pt will remain free from fall during hospitalization.  Fall band to remain in place.  Pt will wear appropriate footwear.  1:1 within arm reach, gait belt for transfers, staff assist with ADL's   6/24/2020 1156 by Maria Del Carmen Hogue, RN  Outcome: Improving   Continues 1:1 within arms reach and gait belt.  No falls this shift.    Face to face end of shift report communicated to oncoming shift RN.

## 2020-06-24 NOTE — PLAN OF CARE
Face to face end of shift report will be communicated to oncoming RN.  Problem: Adult Behavioral Health Plan of Care  Goal: Patient-Specific Goal (Individualization)  Description: Pt will follow recommendations of treatment team.  Pt will be compliant with medications.  Pt will attend 50 % of groups.  Pt will sleep 6- 8 hours each night.   6/24/2020 0105 by Gely Gómez RN  Outcome: No Change     Problem: Suicide Risk  Goal: Absence of Self-Harm  Description: Pt will remain free from self harm/injury during hospitalization.  Pt will have a decrease in suicidal thoughts during hospitalization.    6/24/2020 0105 by Gely Gómez RN  Outcome: No Change     Problem: Fall Injury Risk  Goal: Absence of Fall and Fall-Related Injury  Description: Pt will remain free from fall during hospitalization.  Fall band to remain in place.  Pt will wear appropriate footwear.  1:1 within arm reach, gait belt for transfers, staff assist with ADL's   6/24/2020 0105 by Gely Gómez RN  Outcome: Improving   Face to face end of shift report obtained from TOMMY Mcgrath. Pt observed resting in bed. 1:1 staff with patient.  Pt got up and walked hallway with 1:1 staff. Pt slightly unsteady. Pt slept 4 hours this shift. 15 minutes and PRN safety checks completed with no noted complains. No self harm or falls noted or reported so far this shift. Will continue to monitor.

## 2020-06-24 NOTE — PLAN OF CARE
Discharge Planner OT   Patient plan for discharge: pt would like to discharge home  Current status: eval and initial treatment completed.  MOCA completed and pt scored 24/30 with score of 26 or above indicating normal cognition    MOCA  Visuospatial/ Executive Functionin/5  Trail making (alternating letters and numbers):   Shape copy: 0/1  Clock drawing: 3/3  Namin/3  Immediate Recall (not scored):   Patient accurately verbalized 5/5 non-related words  Attention:    Number repetition:   Number reversal:   Sustained attention: Patient identifying 11/11 targets with x5 false positives  Serial subtraction: Patient completed serial 7 subtractions  calculations  Language: 2/3  Sentence repetition: 2/2 when asked to repeat a sentence word-for-word  Divergent naming: Patient named 8 items beginning with the letter /f/ within one minute  Abstract Thinkin/2  Similarities between 2 items (abstract thinking): 2/2  Delayed Recall: 3/5  Patient recalled 3/5 words after approx 5 minute delay without cues.  Orientation:   Patient oriented to month, year, day of the week, place and city,but was not oriented to the date    TOTAL SCORE: 24/30  Minimal deficit noted in areas of executive functioning, naming, language, and delayed recall.  No major concerns noted according to this assessment.  Plan to further assess cognition to make safe discharge recommendations.  Pt requested this writer return in the afternoon to assess her need for further 1:1, states she feels she does not need it.  Went to assess pt balance and was meeting with provider, has been for some time.  Will assess mobility early tomorrow.      Barriers to return to prior living situation: impulsive, poor coping skills, impaired cognition, fall risk, depression  Recommendations for discharge: TBD, filing for commitment vs vol in leiu, would benefit from assisted living or supported living environment  Rationale for recommendations:  clinical reasoning and pt presentation       Entered by: Maria Del Carmen Butterfield 06/24/2020 3:42 PM

## 2020-06-24 NOTE — PLAN OF CARE
"Problem: Adult Behavioral Health Plan of Care  Goal: Patient-Specific Goal (Individualization)  Description: Pt will follow recommendations of treatment team.  Pt will be compliant with medications.  Pt will attend 50 % of groups.  Pt will sleep 6- 8 hours each night.   6/23/2020 2228 by Ramila Bal RN  Outcome: No Change    Pt remains with 1:1 staff for safety, is pleasant and cooperative with all interactions. Denied having thoughts of suicide or self-harm, denied hallucinations. Appears sad at times, speech is slow and deliberate in conversation. Pt quietly reading her hold papers this evening and asks \"what does a patient do to cause them to keep someone past the 5 days?\" Is in agreement with medication changes, knows meds and takes as ordered. Showered this evening, did attend some group time. Ambulated in hallway and is more steady than 24 hours ago. Denied physical complaint.    Problem: Suicide Risk  Goal: Absence of Self-Harm  Description: Pt will remain free from self harm/injury during hospitalization.  Pt will have a decrease in suicidal thoughts during hospitalization.    6/23/2020 2228 by Ramila Bal RN  Outcome: Improving       Problem: Fall Injury Risk  Goal: Absence of Fall and Fall-Related Injury  Description: Pt will remain free from fall during hospitalization.  Fall band to remain in place.  Pt will wear appropriate footwear.  1:1 within arm reach, gait belt for transfers, staff assist with ADL's   6/23/2020 2228 by Ramila Bal, RN  Outcome: Improving    2330 - Face to face end of shift report communicated to oncoming shift RN.     Ramila Bal RN  6/23/2020  11:12 PM          "

## 2020-06-24 NOTE — PROGRESS NOTES
"[facility]    Medical Services Progress Note    Date of Service (when I saw the patient): 06/24/2020    Assessment & Plan     Principal Problem:    Suicide attempt (H)     Active Medical Problems:    Chronic obstructive pulmonary disease with acute exacerbation (H)- pt not a good historian. Pt denies history of COPD, denies taking any medications for it. Pt denies chest pian, sob, difficulty breathing.   6/23/20- pt denies chest pain, sob, difficulty breathing.   6/24/20- pt denies chest pain, sob, difficulty breathing.       OBDULIO (obstructive sleep apnea)- note in care everywhere 12/17/19 from pulmonary states pt is suppose to be on a CPAP for OBDULIO. Pt states \"it doesn't help, so I don't use it.\" Pt states she doesn't want anyone to bring it by the hospital so she can use it during her admission.        Overactive bladder- pt reports she takes vesicare daily. Pt could not recall dose.   6/23/20- pt takes vesicare 10 mg daily at home, pharmacy formulary substitute equialency is Detrol LA 4 mg.      UTI- pt started on cipro according to fill date from pharmacy on 6/15/20 for 10 days. Pt will finish antibiotic on 6/25/20. Pt denies any urinary symptoms. Will repeat UA after completion of antibiotic.   6/24/20- pt denies any urinary symptoms, ordered repeat UA for Friday after completion of antibiotic.     covid screening- negative      Code Status: Prior.    Blessing Skinner CNP        -Data reviewed today: I reviewed all new labs and imaging results over the last 24 hours.     Physical Exam   Temp: 97.1  F (36.2  C) Temp src: Tympanic BP: 91/53 Pulse: 59   Resp: 12 SpO2: 93 % O2 Device: None (Room air)    Vitals:    06/22/20 0047   Weight: 74.8 kg (165 lb)     Vital Signs with Ranges  Temp:  [96  F (35.6  C)-97.1  F (36.2  C)] 97.1  F (36.2  C)  Pulse:  [59-67] 59  Resp:  [12-15] 12  BP: ()/(45-59) 91/53  SpO2:  [93 %-94 %] 93 %  No intake/output data recorded.    Constitutional: NAD, vitals stable, " appears well  Respiratory: CTA bilaterally, no rales, no rhonchi, no wheezes, no crackles, symmetric rise and fall of chest, good effort   Cardiovascular: RRR, S1, S2, no extra heart sounds, no murmurs, no edema   GI: normal inspection, normoactive bowel sounds x 4, no tenderness, no guarding, no masses, no organomegaly   Skin/Integumen: warm, dry, intact, no open wounds, no rashes, no cyanosis       Medications     ciprofloxacin  500 mg Oral BID     gabapentin  100 mg Oral At Bedtime     lactobacillus rhamnosus (GG)  1 capsule Oral BID     liothyronine  10 mcg Oral Daily     PHENobarbital  16.2 mg Oral BID     tolterodine ER  4 mg Oral Daily     vitamin E  400 Units Oral Daily     vortioxetine  2.5 mg Oral Daily       Data   Recent Labs   Lab 06/22/20  0123   WBC 5.7   HGB 12.1   MCV 95         POTASSIUM 4.5   CHLORIDE 111*   CO2 27   BUN 14   CR 1.02   ANIONGAP 4   ANI 8.8   GLC 90   ALBUMIN 3.4   PROTTOTAL 6.6*   BILITOTAL 0.2   ALKPHOS 41   ALT 21   AST 15       No results found for this or any previous visit (from the past 24 hour(s)).

## 2020-06-24 NOTE — PLAN OF CARE
Face to face shift report received from TOMMY Hong.       Problem: Adult Behavioral Health Plan of Care  Goal: Patient-Specific Goal (Individualization)  Description: Pt will follow recommendations of treatment team.  Pt will be compliant with medications.  Pt will attend 50 % of groups.  Pt will sleep 6- 8 hours each night.   6/24/2020 1143 by Silvia Young RN  Outcome: No Change  Note: Remains on 1:1 continuous observation for fall risk. Pt utilizes gait belt and 1A for transfers and ambulation. Calm, cooperative, and medication compliant. Denies thoughts of harming self or others. Flat affect. Pleasant during conversation. Walking frequently in hallways with staff present. No reports of pain.  Transferred to [facility] at 1135, accompanied by security personnel and unit staff. Nurse to nurse report called to TOMMY Lopez       Problem: Suicide Risk  Goal: Absence of Self-Harm  Description: Pt will remain free from self harm/injury during hospitalization.  Pt will have a decrease in suicidal thoughts during hospitalization.    6/24/2020 1143 by Silvia Young RN  Outcome: Improving         Problem: Fall Injury Risk  Goal: Absence of Fall and Fall-Related Injury  Description: Pt will remain free from fall during hospitalization.  Fall band to remain in place.  Pt will wear appropriate footwear.  1:1 within arm reach, gait belt for transfers, staff assist with ADL's   6/24/2020 1143 by Silvia Young RN  Outcome: Improving

## 2020-06-24 NOTE — PLAN OF CARE
Karen with [facility] called concerning patient. She was wondering if the patient was willing to sign a Voluntary in Lieu of Commitment form.    SW provided patient with the commitment rights paper work to patient. SW explained the commitment process to patient. SW review the meaning and the purpose of the Voluntary in Lieu of Commitment form with patient. Patient asked details about what the form was. SW explained what the form was four times.     Karen with [facility] called and screened patient for commitment. She reviewed the process and the voluntary in lieu of commitment form with patient numerous times. Patient acknowledged understanding of the commitment process and stated she was willing to sign the Voluntary in Lieu of Commitment form.    Patient still addressed she did not trust anyone and was not sure if she wanted to sign the Voluntary in Lieu of Commitment form. Patient asked to speak with the Nurse Practitioner.     Nurse Practitioner spoke to patient.

## 2020-06-25 NOTE — PLAN OF CARE
Observed pt lying on her right side - eyes closed - non-labored breathing noted - one to one staff at bedside.  It is now 0640 and pt is in bed - has been awake several times during the noc - was a bit feisty in beginning of noc and was trying to run in the vazquez - redirected to have a slower pace - slept approx 6.5 one to one staff continues Face to face end of shift report communicated to oncoming TOMMY Ocampo RN  6/25/2020  6:41 AM

## 2020-06-25 NOTE — PLAN OF CARE
"  Problem: Adult Behavioral Health Plan of Care  Goal: Patient-Specific Goal (Individualization)  Description: Pt will follow recommendations of treatment team.  Pt will be compliant with medications.  Pt will attend 50 % of groups.  Pt will sleep 6- 8 hours each night.   6/25/2020 1652 by Peg Matamoros, RN  Note: Patient at nurse's station in beginning of shift, appears anxious with questions about seeing a provider. Asks this writer to read a 4 page letter about events leading up to admission. Patient appears confused about talking about being voluntary in lieu and needing to have guns removed from home. States, \"I signed something yesterday but I don't know what it was. I was also told I would get a copy and I didn't\". Difficult to help patient understand as there are no notes confirming that patient signed voluntary in lieu. Patient was able to calm down after talking to writer. Able to get phone numbers off cellphone with staff assistance.   This writer talked to a Leo Soria, who reports going through patient's house with no guns there. States, \"I know every corner of that house and there are no guns there. The  must have taken it\". Will pass contact information to next shift for .   Patient attending groups off/on throughout afternoon, otherwise laying in bed reading. Denies all assessment criteria stating, \"I'm just wanting to go home\". Compliant with all scheduled medications.   Laying in bed to rest after 2000.   2301- Up to nurse's station requesting something for sleep. Received PRN Trazodone 50 mg. Continue to monitor at this time.        Problem: Suicide Risk  Goal: Absence of Self-Harm  Description: Pt will remain free from self harm/injury during hospitalization.  Pt will have a decrease in suicidal thoughts during hospitalization.    6/25/2020 1652 by Peg Matamoros, RN  Note: Continue to monitor at this time.     Face to face end of shift report communicated to oncbell RN. "     Peg Matamoros RN  6/25/2020  11:07 PM

## 2020-06-25 NOTE — PLAN OF CARE
Problem: Adult Behavioral Health Plan of Care  Goal: Patient-Specific Goal (Individualization)  Description: Pt will follow recommendations of treatment team.  Pt will be compliant with medications.  Pt will attend 50 % of groups.  Pt will sleep 6- 8 hours each night.   Outcome: Improving  Note:   Up early this am ready for breakfast.  Does have attendant present at all times to prevent falls.  Answers questions approprietly, pleasant and cooperative with nursing assessment.  Ambulating in hallway to get some exercise.    Problem: Fall Injury Risk  Goal: Absence of Fall and Fall-Related Injury  Description: Pt will remain free from fall during hospitalization.  Fall band to remain in place.  Pt will wear appropriate footwear.  1:1 within arm reach, gait belt for transfers, staff assist with ADL's   Outcome: Improving   Has had no falls or injuries during this hospital stay.  Problem: Suicide Risk  Goal: Absence of Self-Harm  Description: Pt will remain free from self harm/injury during hospitalization.  Pt will have a decrease in suicidal thoughts during hospitalization.    Outcome: Improving   Denies being suicidal.   Face to face report given with opportunity to observe patient.    Report given to oncoming RN    Leonora Marmolejo RN   6/25/2020  3:03 PM

## 2020-06-25 NOTE — PLAN OF CARE
Discharge Planner OT   Patient plan for discharge: pt would like to discharge home  Current status: pt particiapted in the Russ Balance Scale.  Scored 48/56 indicating low risk for falls (scored of 41-56 indicates low risk). Pt is steady on her feet, has no concerns with her mobility at this time.  Recommend removing 1:1.  Gave pt her shoes and encouraged her to ask for help from staff if she ever feels she needs it.  Educated in safety on the unit and safe mobility.   Barriers to return to prior living situation: poor coping skills, depression,isolated  Recommendations for discharge: recommend removing 1:1 as pt displays safety with mobility.  At this time pt would likely benefit from supportive living such as assisted living of senior apartments to allow for socialization and assistance as needed.    Rationale for recommendations: clinical reasoning, score on Russ scale       Entered by: Maria Del Carmen Butterfield 06/25/2020 9:23 AM
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
## 2020-06-26 NOTE — PLAN OF CARE
"Spoke with pt at approximately midnight - she requested and received trazadone 50 mg as a repeat dose - r/t c/o ongoing insomnia - did say a \"SCL candy bar would help\" advised if one found we would send it her way.  Was pleasant, polite, appropriate and returned to bed.   Was given name of friend of pt who was to get into her home and remove any gun or guns she may have - he relayed he could find none and that possibly the police had removed. His name and contact number are in the sticky notes.  It is now 0650 and pt has been restless most of the noc - repeat trazadone not effective.  "

## 2020-06-26 NOTE — PLAN OF CARE
"  Problem: Adult Behavioral Health Plan of Care  Goal: Patient-Specific Goal (Individualization)  Description: Pt will follow recommendations of treatment team.  Pt will be compliant with medications.  Pt will attend 50 % of groups.  Pt will sleep 6- 8 hours each night.     Patient withdrawn, spending time in her room, is social and pleasant with staff. Affect is full range, mood is calm. Denies all criteria. States she is good, states \"that lady told me I will be going home today, by noon\". Patient updated on needing to collect urinalysis, she states \"I can do that, I will let you know when I'm ready\". She did shower after breakfast, then laid down in her bed. Has been appropriate with staff and peers.   1115-urine collected by patient and sent to lab. Provider updated on patient wanting to leave. Patient requesting a list of her scheduled medications, writer wrote her a list and went over it with her, she questioned a few \"missing\" medications, provider updated then explained to patient, patient understanding.   Face to face end of shift report communicated to oncoming shift RN.     Sammie Hackett RN  6/26/2020  2:40 PM            Outcome: Improving    Problem: Suicide Risk  Goal: Absence of Self-Harm  Description: Pt will remain free from self harm/injury during hospitalization.  Pt will have a decrease in suicidal thoughts during hospitalization.    Patient denies SI, has remained free from self harm/injury.   Outcome: Improving     Problem: Fall Injury Risk  Goal: Absence of Fall and Fall-Related Injury  Description: Pt will remain free from fall during hospitalization.  Fall band to remain in place.  Pt will wear appropriate footwear.    Patient has not experienced a fall, has been steady on her feet.   Outcome: Improving          "

## 2020-06-26 NOTE — PROGRESS NOTES
"[facility]    Medical Services Progress Note    Date of Service (when I saw the patient): 06/26/2020    Assessment & Plan     Principal Problem:    Suicide attempt (H)     Active Medical Problems:    Chronic obstructive pulmonary disease with acute exacerbation (H)- pt not a good historian. Pt denies history of COPD, denies taking any medications for it. Pt denies chest pian, sob, difficulty breathing.   6/23/20- pt denies chest pain, sob, difficulty breathing.   6/24/20- pt denies chest pain, sob, difficulty breathing.   6/26/20- pt denies chest pain, sob, difficulty breathing.       OBDULIO (obstructive sleep apnea)- note in care everywhere 12/17/19 from pulmonary states pt is suppose to be on a CPAP for OBDULIO. Pt states \"it doesn't help, so I don't use it.\" Pt states she doesn't want anyone to bring it by the hospital so she can use it during her admission.        Overactive bladder- pt reports she takes vesicare daily. Pt could not recall dose.   6/23/20- pt takes vesicare 10 mg daily at home, pharmacy formulary substitute equialency is Detrol LA 4 mg.      UTI- pt started on cipro according to fill date from pharmacy on 6/15/20 for 10 days. Pt will finish antibiotic on 6/25/20. Pt denies any urinary symptoms. Will repeat UA after completion of antibiotic.   6/24/20- pt denies any urinary symptoms, ordered repeat UA for Friday after completion of antibiotic.  6/26/20- pt denies any urinary symptoms. States \"I think the antibiotic took care of it\". Repeat UA was negative for infection. Few bacteria noted, likely contaminant.          covid screening- negative    Pt medically stable, no acute medical concerns. Chronic medical problems stable. Will sign off. Please consult for any new medical issues or concerns.       Code Status: Prior.    Blessing Skinner CNP        -Data reviewed today: I reviewed all new labs and imaging results over the last 24 hours.     Physical Exam   Temp: 97.3  F (36.3  C) Temp src: " Tympanic BP: 111/56 Pulse: 88   Resp: 20 SpO2: 95 % O2 Device: None (Room air)    Vitals:    06/22/20 0047   Weight: 74.8 kg (165 lb)     Vital Signs with Ranges  Temp:  [97.3  F (36.3  C)-97.5  F (36.4  C)] 97.3  F (36.3  C)  Pulse:  [64-88] 88  Resp:  [12-20] 20  BP: ()/(47-72) 111/56  SpO2:  [94 %-97 %] 95 %  No intake/output data recorded.    Constitutional: NAD, vitals stable, appears well  Respiratory: CTA bilaterally, no rales, no rhonchi, no wheezes, no crackles, symmetric rise and fall of chest, good effort   Cardiovascular: RRR, S1, S2, no extra heart sounds, no murmurs, no edema   GI: normal inspection, normoactive bowel sounds x 4, no tenderness, no guarding, no masses, no organomegaly   Skin/Integumen: warm, dry, intact, no open wounds, no rashes, no cyanosis       Medications     gabapentin  100 mg Oral At Bedtime     lactobacillus rhamnosus (GG)  1 capsule Oral BID     liothyronine  10 mcg Oral Daily     PHENobarbital  16.2 mg Oral BID     tolterodine ER  4 mg Oral Daily     vitamin E  1,200 Units Oral Daily     vortioxetine  2.5 mg Oral Daily       Data   Recent Labs   Lab 06/22/20  0123   WBC 5.7   HGB 12.1   MCV 95         POTASSIUM 4.5   CHLORIDE 111*   CO2 27   BUN 14   CR 1.02   ANIONGAP 4   ANI 8.8   GLC 90   ALBUMIN 3.4   PROTTOTAL 6.6*   BILITOTAL 0.2   ALKPHOS 41   ALT 21   AST 15       No results found for this or any previous visit (from the past 24 hour(s)).

## 2020-06-26 NOTE — PLAN OF CARE
"Discharge Planner OT   Patient plan for discharge: return home, interested in PHP  Current status: Pt was educated in progressive muscle relaxation and guided throuigh a progressive muscle relaxation routine.  Handou issued as well.  pt states that she has done progressive muscle relaxation in the past ans enjoys it.  Today, she states that she feels \"Nice and relaxed\" after the routine.  Also discussed healthy sleep habits.  Handout left with pt so she can reveiw it again on her own time.  Encouraged her to make one sleep habit change.    Barriers to return to prior living situation: impaired coping skills  Recommendations for discharge: recommend pt return home, would benefit from PHP  Rationale for recommendations: clinical reasoning.         Entered by: Maria Del Carmen Butterfield 06/26/2020 11:04 AM       "

## 2020-06-26 NOTE — PLAN OF CARE
"  Problem: Adult Behavioral Health Plan of Care  Goal: Patient-Specific Goal (Individualization)  Description: Pt will follow recommendations of treatment team.  Pt will be compliant with medications.  Pt will attend 50 % of groups.  Pt will sleep 6- 8 hours each night.   6/26/2020 1622 by Peg Matamoros, RN  Note: Patient up on unit off/on throughout shift. Socializing with some peers and attends groups, otherwise laying in bed reading. Denies all assessment criteria stating, \"I'm all good. Patiently waiting to go home.\" Full range affect and friendly during conversation. Compliant with scheduled medications. No concerns at this time. Continue to monitor at this time.        Problem: Suicide Risk  Goal: Absence of Self-Harm  Description: Pt will remain free from self harm/injury during hospitalization.  Pt will have a decrease in suicidal thoughts during hospitalization.    6/26/2020 1622 by Peg Matamoros, RN  Note: Continue to monitor at this time.     Face to face end of shift report communicated to oncoming RN.     Peg Matamoros, RN  6/26/2020  10:44 PM        "

## 2020-06-26 NOTE — PROGRESS NOTES
"[facility]  Psychiatric Progress Note      Impression:   This is a 75 year old yo female who purchased a gun last week when her depression increased \"in case my depression got worse then I could use it\".     Alycia is up in the lounge when I see her today. She asks me again when she will be able to leave. We discussed that a PHP referral was going to be sent and that I would like her to have a start date prior to her leaving the hospital. She is interested in the PHP program, has been attending groups during the day. Denies any suicidal thoughts. Had a friend go to her house though he was unable to locate the firearm, so it is possible this was taken by law enforcement, SW to call and verify. We did discuss possibly increasing Trintellix today though she would like to \"think about this, it's my choice right?\" She does report improvement in mood since admission, affect is slightly brighter than when I met with her 2 days ago. Agreeable to stay voluntary in lieu of commitment.    Educated regarding medication indications, risks, benefits, side effects, contraindications and possible interactions. Verbally expressed understanding.        Diagnoses:   MDD, recurrent, severe with anxious distress  R/o bipolar 2 mixed episode  Obstructive sleep apnea- requires cpap though does not use           Attestation:  Patient has been seen and evaluated by me,  Silva Bobo NP          Interim History:   The patient's care was discussed with the treatment team and chart notes were reviewed.            Medications:       gabapentin  100 mg Oral At Bedtime     lactobacillus rhamnosus (GG)  1 capsule Oral BID     liothyronine  10 mcg Oral Daily     PHENobarbital  8.1 mg Oral At Bedtime     [START ON 6/27/2020] PHENobarbital  16.2 mg Oral Daily     tolterodine ER  4 mg Oral Daily     Vitamin D3  2,000 Units Oral Daily     vitamin E  1,200 Units Oral Daily     vortioxetine  2.5 mg Oral Daily            10 point ROS - " "denies concerns today       Allergies:   No Known Allergies         Psychiatric Examination:   /56   Pulse 69   Temp 97.3  F (36.3  C) (Tympanic)   Resp 16   Ht 1.653 m (5' 5.08\")   Wt 74.8 kg (165 lb)   SpO2 92%   BMI 27.39 kg/m    Weight is 165 lbs 0 oz  Body mass index is 27.39 kg/m .    Appearance:  adequately groomed and dressed in hospital scrubs, awake and alert  Attitude: pleasant, cooperative  Eye Contact:  fair  Mood: improvement in depression and anxiety  Affect: little brighter today  Speech:  clear, coherent  Psychomotor Behavior:  no evidence of tardive dyskinesia, dystonia, or tics  Thought Process: linear, more logical  Associations:  no loose associations  Thought Content: denies any active suicidal thoughts, denies hallucinations  Insight: limited  Judgment: limited  Oriented to:  time, person, and place  Attention Span and Concentration:  intact  Recent and Remote Memory:  intact  Fund of Knowledge: appropriate fo education  Muscle Strength and Tone: normal  Gait and Station: normal           Labs:     Results for orders placed or performed during the hospital encounter of 06/22/20 (from the past 48 hour(s))   UA with Microscopic reflex to Culture    Specimen: Midstream Urine   Result Value Ref Range    Color Urine Yellow     Appearance Urine Clear     Glucose Urine Negative NEG^Negative mg/dL    Bilirubin Urine Negative NEG^Negative    Ketones Urine Negative NEG^Negative mg/dL    Specific Gravity Urine 1.025 1.003 - 1.035    Blood Urine Negative NEG^Negative    pH Urine 5.5 4.7 - 8.0 pH    Protein Albumin Urine Negative NEG^Negative mg/dL    Urobilinogen mg/dL Normal 0.0 - 2.0 mg/dL    Nitrite Urine Negative NEG^Negative    Leukocyte Esterase Urine Negative NEG^Negative    Source Midstream Urine     WBC Urine 1 0 - 5 /HPF    RBC Urine 1 0 - 2 /HPF    Bacteria Urine Few (A) NEG^Negative /HPF    Mucous Urine Present (A) NEG^Negative /LPF              Plan/Treatment Team     BEHAVIORAL " TEAM DISCUSSION    Progress: Gradual. Denies SI, improvement in depression    Continued Stay Criteria/Rationale: significant SI and loaded gun prior to admission. Voluntary in lieu of commitment. PHP referral sent.     Medical/Physical: denies concerns    Precautions: fall    Falls precaution?: YES- care planned  Behavioral Orders   Procedures     Code 1 - Restrict to Unit     Routine Programming     As clinically indicated     Status 15     Every 15 minutes.       Plan for this encounter/Med Changes/Labs:   Continue Trintellix 2.5 mg daily- slow metabolizer per Genesight testing- consider increase if agreeable  Continue Vitamin E 1200 units- may help with TD  Continue Gabapentin 100 mg at bedtime  Continue Cytomel 10 mcg daily  Continue Phenobarbital taper, likely decrease again tomorrow  Consider addition of Lithium of Lamictal for mood stability  Agreed to voluntary in lieu of commitment  PHP referral sent today       Participants: Silva Bobo NP, Antony NEVAREZW, nursing, Kassandra De Dios OT, Maria Del Carmen Butterfield OT,        ALBINO greater than 5 days.

## 2020-06-27 NOTE — PROGRESS NOTES
"[facility]  Psychiatric Progress Note      Impression:   This is a 75 year old yo female who purchased a gun last week when her depression increased \"in case my depression got worse then I could use it\".     Alycia is in her room reviewing the handouts she received during group when I go to see her today. She reports feeling \"pretty good\". States she has been working on making a wind chime in group for her home. Denies any suicidal thoughts. Denies any side effects from medications, will decrease Phenobarbital again, taper to end on Monday. Still does not feel that Trintellix needs to be increased, though likely will do better on lower dosing given how she metabolizes this. Discussed that we will follow-up with PHP referral on Monday to see if there is a tentative start date.      Educated regarding medication indications, risks, benefits, side effects, contraindications and possible interactions. Verbally expressed understanding.        Diagnoses:   MDD, recurrent, severe with anxious distress  R/o bipolar 2 mixed episode  Obstructive sleep apnea- requires cpap though does not use           Attestation:  Patient has been seen and evaluated by me,  Silva Bobo NP          Interim History:   The patient's care was discussed with the treatment team and chart notes were reviewed.            Medications:       gabapentin  100 mg Oral At Bedtime     lactobacillus rhamnosus (GG)  1 capsule Oral BID     liothyronine  10 mcg Oral Daily     PHENobarbital  8.1 mg Oral At Bedtime     PHENobarbital  16.2 mg Oral Daily     tolterodine ER  4 mg Oral Daily     Vitamin D3  2,000 Units Oral Daily     vitamin E  1,200 Units Oral Daily     vortioxetine  2.5 mg Oral Daily          10 point ROS - denies concerns today       Allergies:   No Known Allergies         Psychiatric Examination:   /71   Pulse 76   Temp 98.1  F (36.7  C) (Tympanic)   Resp 16   Ht 1.653 m (5' 5.08\")   Wt 74.8 kg (165 lb)   SpO2 95%   " "BMI 27.39 kg/m    Weight is 165 lbs 0 oz  Body mass index is 27.39 kg/m .    Appearance:  adequately groomed and dressed in hospital scrubs, awake and alert  Attitude: pleasant, cooperative  Eye Contact: good  Mood: \"fine\", less depressed  Affect: little brighter today  Speech:  clear, coherent  Psychomotor Behavior:  no evidence of tardive dyskinesia, dystonia, or tics  Thought Process: linear, more logical  Associations:  no loose associations  Thought Content: denies any active suicidal thoughts, denies hallucinations  Insight: limited  Judgment: limited  Oriented to:  time, person, and place  Attention Span and Concentration:  intact  Recent and Remote Memory:  intact  Fund of Knowledge: appropriate fo education  Muscle Strength and Tone: normal  Gait and Station: normal           Labs:     No results found for this or any previous visit (from the past 24 hour(s)).           Plan/Treatment Team     BEHAVIORAL TEAM DISCUSSION    Progress: Fair. Denies SI, mood improving. Agreeable to discharge recommendations.    Continued Stay Criteria/Rationale: Voluntary in lieu of commitment. PHP referral sent. Need to ensure firearm removed from home prior to discharge.    Medical/Physical: denies concerns    Precautions: fall    Falls precaution?: YES- care planned  Behavioral Orders   Procedures     Code 1 - Restrict to Unit     Routine Programming     As clinically indicated     Status 15     Every 15 minutes.       Plan for this encounter/Med Changes/Labs:   Continue Trintellix 2.5 mg daily- slow metabolizer per Genesight testing- consider increase if agreeable  Continue Vitamin E 1200 units- may help with TD  Continue Gabapentin 100 mg at bedtime  Continue Cytomel 10 mcg daily  Continue Phenobarbital taper 8.1 mg BID tomorrow, then once daily, then stop  Consider addition of Lithium of Lamictal for mood stability  Agreed to voluntary in lieu of commitment  PHP referral sent Friday  Labs: TSH, Vit D         Participants: " Silva Bobo NP, nursing      ELOS: 3-5 days to ensure safe discharge plan

## 2020-06-27 NOTE — PLAN OF CARE
2300: End of shift report received from Peg JAMISON RN.     2355: Pt seen in bed at this time appears to be sleeping.     Pt slept approximately 7 hours. No complaints this shift.    0700: Face to face end of shift report  to be communicated to oncoming RN.     Peg Alexandre RN  6/27/2020  12:15 AM        Problem: Adult Behavioral Health Plan of Care  Goal: Patient-Specific Goal (Individualization)  Description: Pt will follow recommendations of treatment team.  Pt will be compliant with medications.  Pt will attend 50 % of groups.  Pt will sleep 6- 8 hours each night.   6/27/2020 0013 by Peg Alexandre, RN  Outcome: Improving  Note:        Problem: Suicide Risk  Goal: Absence of Self-Harm  Description: Pt will remain free from self harm/injury during hospitalization.  Pt will have a decrease in suicidal thoughts during hospitalization.    6/27/2020 0013 by Peg Alexandre RN  Outcome: Improving     Problem: Fall Injury Risk  Goal: Absence of Fall and Fall-Related Injury  Description: Pt will remain free from fall during hospitalization.  Fall band to remain in place.  Pt will wear appropriate footwear.  6/27/2020 0013 by Peg Alexanrde, RN  Outcome: Improving

## 2020-06-27 NOTE — PLAN OF CARE
Problem: Adult Behavioral Health Plan of Care  Goal: Patient-Specific Goal (Individualization)  Description: Pt will follow recommendations of treatment team.  Pt will be compliant with medications.  Pt will attend 50 % of groups.  Pt will sleep 6- 8 hours each night.   6/27/2020 1619 by Peg Matamoros, RN  Note: Patient continues to be up on unit with peers off/on throughout shift. Attends most groups and reading in bedroom off/on. Flat/blunt affect but friendly during conversation. Denies SI, anxiety and pain this shift. Reports some continued depression d/t situation and talks about wanting to go home. Writes about medications and coping skills in journal. Compliant with scheduled medications. Laying in bed reading at end of shift. Continue to monitor at this time.        Problem: Suicide Risk  Goal: Absence of Self-Harm  Description: Pt will remain free from self harm/injury during hospitalization.  Pt will have a decrease in suicidal thoughts during hospitalization.    6/27/2020 1619 by Peg Matamoros, RN  Note: Continue to monitor at this time.      Face to face end of shift report communicated to oncoming RN.     Peg Matamoros, RN  6/27/2020  10:55 PM

## 2020-06-27 NOTE — PLAN OF CARE
"Face to face end of shift report communicated from Peg SMALLS RN. Pt in bed at the start of the shift.     Viviane Chacon RN  6/27/2020  10:25 AM       Problem: Adult Behavioral Health Plan of Care  Goal: Patient-Specific Goal (Individualization)  Description: Pt will follow recommendations of treatment team.  Pt will be compliant with medications.  Pt will attend 50 % of groups.  Pt will sleep 6- 8 hours each night.     Pt in bed most of the shift but did attend groups. Pt compliant with medications, cooperative with assessment. Pt denies symptoms. Pt does report depression but denies anxiety, SI and hallucinations. Pt reports journaling and \"learning her meds\" with information on each one. Pt did show interest in the \"craft room\" and attended both morning and afternoon groups.   6/27/2020 1024 by Viviane Chacon RN  Outcome: Improving  Note:        Problem: Suicide Risk  Goal: Absence of Self-Harm  Description: Pt will remain free from self harm/injury during hospitalization.  Pt will have a decrease in suicidal thoughts during hospitalization.    Pt denies SI/HI  6/27/2020 1024 by Viviane Chacon RN  Outcome: Improving     Problem: Fall Injury Risk  Goal: Absence of Fall and Fall-Related Injury  Description: Pt will remain free from fall during hospitalization.  Fall band to remain in place.  Pt will wear appropriate footwear.    Pt has been free of falls this shift.   6/27/2020 1024 by Viviane Chacon RN  Outcome: Improving      Face to face end of shift report communicated to oncoming RN. Reported that pt is a fall and suicide risk.     Viviane Chacon RN  6/27/2020  10:26 AM          "

## 2020-06-28 NOTE — PLAN OF CARE
"  Problem: Adult Behavioral Health Plan of Care  Goal: Patient-Specific Goal (Individualization)  Description: Pt will follow recommendations of treatment team.  Pt will be compliant with medications.  Pt will attend 50 % of groups.  Pt will sleep 6- 8 hours each night.   6/28/2020 1708 by Peg Matamoros, RN  Note: Patient up on unit, participating in some groups throughout shift. Continues to withdraw to bedroom to read off/on. Denies all assessment criteria and is hopeful to be going home this coming week. Observed to be steady on feet all shift. Continues to sneeze at times but no further complaints of allergies. Compliant with scheduled medications. Declines offer for any sleep PRN's stating, \"the fewer medications I have to take, the better.\" Remains up reading towards end of shift. Continue to monitor at this time.        Problem: Suicide Risk  Goal: Absence of Self-Harm  Description: Pt will remain free from self harm/injury during hospitalization.  Pt will have a decrease in suicidal thoughts during hospitalization.    6/28/2020 1708 by Peg Matamoros, RN  Note: Continue to monitor at this time.      Face to face end of shift report communicated to oncoming MILLIE Matamoros, RN  6/28/2020  9:45 PM        "

## 2020-06-28 NOTE — PLAN OF CARE
"Face to face end of shift report communicated from Peg SMALLS RN. Pt in bed at the start of the shift.     Viviane Chacon RN  6/28/2020  10:15 AM       Problem: Adult Behavioral Health Plan of Care  Goal: Patient-Specific Goal (Individualization)  Description: Pt will follow recommendations of treatment team.  Pt will be compliant with medications.  Pt will attend 50 % of groups.  Pt will sleep 6- 8 hours each night.     Pt in bed at the start of the shift, pt up most of the shift, attended group, ate in the lounge. Pt denies pain, anxiety, SI and hallucinations. Pt does state that her depression is prob a 1 or 2 out of 10 but only because she is still in here. Pt discussed her fall last night calling it a \"stupid fall\" because she fell over her book on the floor. Pt states that she is not in pain from the fall but does feel itchy on her ankles and chest. Pt has also been sneezing when she is in the lounge. Pt states she is allergic to something here but doesn't want to take a prn stating that she is trying not to take a lot of medications. Pt took a shower this morning, her scrubs were then washed in dreft and will be returned to her.   6/28/2020 1014 by Viviane Chacon, RN  Outcome: Improving  Note:        Problem: Suicide Risk  Goal: Absence of Self-Harm  Description: Pt will remain free from self harm/injury during hospitalization.  Pt will have a decrease in suicidal thoughts during hospitalization.    Pt denies suicidal ideation.     6/28/2020 1014 by Viviane Chacon RN  Outcome: Improving     Problem: Fall Injury Risk  Goal: Absence of Fall and Fall-Related Injury  Description: Pt will remain free from fall during hospitalization.  Fall band to remain in place.  Pt will wear appropriate footwear.    Pt has been free of falls this shift.   6/28/2020 1014 by Viviane Chacon RN  Outcome: Improving     Face to face end of shift report communicated to oncoming RN. Reported that pt is a fall risk and " a risk for suicide.     Viviane Chacon RN  6/28/2020  10:15 AM

## 2020-06-28 NOTE — PROGRESS NOTES
"[facility]  Psychiatric Progress Note      Impression:   This is a 75 year old yo female who purchased a gun last week when her depression increased \"in case my depression got worse then I could use it\".     Alycia is up in group when I see her today. Denies any side effects from medications. Reports that depression as been improving each day she has been in the hospital. States she did not sleep well last night. She does state that she had a \"stupid fall\" out of bed last when when trying to go to the bathroom. Apparently had left her book on the floor next to the bed and slipped. Denies any pain or injuries today. Will likely discharge home this week when follow-up appointments including PHP are scheduled.      Educated regarding medication indications, risks, benefits, side effects, contraindications and possible interactions. Verbally expressed understanding.        Diagnoses:   MDD, recurrent, severe with anxious distress  R/o bipolar 2 mixed episode  Obstructive sleep apnea- requires cpap though does not use           Attestation:  Patient has been seen and evaluated by me,  Silva Bobo NP          Interim History:   The patient's care was discussed with the treatment team and chart notes were reviewed.            Medications:       gabapentin  100 mg Oral At Bedtime     lactobacillus rhamnosus (GG)  1 capsule Oral BID     liothyronine  10 mcg Oral Daily     PHENobarbital  8.1 mg Oral BID     tolterodine ER  4 mg Oral Daily     Vitamin D3  2,000 Units Oral Daily     vitamin E  1,200 Units Oral Daily     vortioxetine  2.5 mg Oral Daily          10 point ROS - denies concerns       Allergies:   No Known Allergies         Psychiatric Examination:   /55   Pulse 79   Temp 98.2  F (36.8  C) (Tympanic)   Resp 14   Ht 1.653 m (5' 5.08\")   Wt 78.7 kg (173 lb 9.6 oz)   SpO2 96%   BMI 28.82 kg/m    Weight is 173 lbs 9.6 oz  Body mass index is 28.82 kg/m .    Appearance:  adequately groomed and " dressed in hospital scrubs, awake and alert  Attitude: pleasant, cooperative  Eye Contact: good  Mood: improving, less depressed  Affect: little brighter today  Speech:  clear, coherent  Psychomotor Behavior:  no evidence of tardive dyskinesia, dystonia, or tics  Thought Process: linear, more logical  Associations:  no loose associations  Thought Content: denies any active suicidal thoughts, denies hallucinations  Insight: limited  Judgment: limited  Oriented to:  time, person, and place  Attention Span and Concentration:  intact  Recent and Remote Memory:  intact  Fund of Knowledge: appropriate fo education  Muscle Strength and Tone: normal  Gait and Station: normal           Labs:     Results for orders placed or performed during the hospital encounter of 06/22/20 (from the past 24 hour(s))   TSH with free T4 reflex   Result Value Ref Range    TSH 2.38 0.40 - 4.00 mU/L     Vitamin D pending         Plan/Treatment Team     BEHAVIORAL TEAM DISCUSSION    Progress: Good. Denies SI, mood improving. Agreeable to discharge recommendations.    Continued Stay Criteria/Rationale: Voluntary in lieu of commitment. PHP referral sent. Need to ensure firearm removed from home prior to discharge.    Medical/Physical: denies concerns    Precautions: fall    Falls precaution?: YES- care planned  Behavioral Orders   Procedures     Code 1 - Restrict to Unit     Routine Programming     As clinically indicated     Status 15     Every 15 minutes.       Plan for this encounter/Med Changes/Labs:   Continue Trintellix 2.5 mg daily- slow metabolizer per [employer] testing- consider increase if agreeable  Continue Vitamin E 1200 units- may help with TD  Continue Gabapentin 100 mg at bedtime  Continue Cytomel 10 mcg daily  Continue Phenobarbital taper 8.1 mg  X 2 more doses then stop  Agreed to voluntary in lieu of commitment  PHP referral sent Friday           Participants: Silva Bobo NP, nursing      ELOS: 2-3 days to ensure safe  discharge plan

## 2020-06-28 NOTE — PLAN OF CARE
"2300: End of shift report received from Peg JAMISON RN.      0045: Pt seen lying on side at this, even unlabored respirations. Pt appears to be sleeping.     0418: During 15 min checks UA notified this writer that she heard a thud and went to check pt to find her on her knees by bed. This writer and co nurse in to assess pt.  Pt states she was trying to get up to use bathroom, and slipped on her book she had laying on floor beside bed. Pt states she caught herself with L hand. L hand and L knee slightly red at this time. Pt denies pain. VSS. Vital signs:  Temp: 97.4  F (36.3  C) Temp src: Tympanic BP: 119/70 Pulse: 78   Resp: 16 SpO2: 94 % O2 Device: None (Room air)       Pt denies dizziness or headache at this time.  Pt had gripper socks on.  Pt has fall band on. Pt encouraged to not place items on floor r/t fall precaution.     0438: House supervisor notified of fall incident at this time.     0441: NP notified at this time of fall incident. NP states will be in to assess pt today, NNO at this time.     Pt complaining of \"rash\" to ankles and abdomen this shift. Upon assessment this writer noticed slight redness to these areas but no rash. Pt denies any laundry soap or soap sensitives. Will pass onto day shift RN so can follow today and notify provider.    Pt slept approximately 2.5 hours this shift.    0700:Face to face end of shift report  to be communicated to oncoming RN.    Peg Alexandre RN  6/28/2020  12:27 AM       Problem: Adult Behavioral Health Plan of Care  Goal: Patient-Specific Goal (Individualization)  Description: Pt will follow recommendations of treatment team.  Pt will be compliant with medications.  Pt will attend 50 % of groups.  Pt will sleep 6- 8 hours each night.   6/28/2020 0026 by Peg Alexandre, RN  Outcome: Improving  Note:        Problem: Suicide Risk  Goal: Absence of Self-Harm  Description: Pt will remain free from self harm/injury during hospitalization.  Pt will have a decrease " in suicidal thoughts during hospitalization.    6/28/2020 0026 by Peg Alexnadre, RN  Outcome: Improving     Problem: Fall Injury Risk  Goal: Absence of Fall and Fall-Related Injury  Description: Pt will remain free from fall during hospitalization.  Fall band to remain in place.  Pt will wear appropriate footwear.  6/28/2020 0026 by Peg Alexandre, RN  Outcome: Declining   Pt had a fall incident this shift at 0418.

## 2020-06-29 NOTE — PLAN OF CARE
Occupational Therapy Discharge Summary    Reason for therapy discharge:    Discharged to home with outpatient therapy.  All goals and outcomes met, no further needs identified.    Progress towards therapy goal(s). See goals on Care Plan in [facility] electronic health record for goal details.  Goals met    Therapy recommendation(s):    No further therapy is recommended.  Pt is interested in PHP

## 2020-06-29 NOTE — PLAN OF CARE
Observed pt in her room - lying in a supine position - reading a paperback - denies pain or discomfort - offered fluids or a snack - politely declined and went back to reading book..  It is now 0640 and pt slept very poorly last night - 3.5 to 4 hours at best - was up in lounge reading a paperback for part of the noc. Polite, pleasant and appropriate.Face to face end of shift report communicated to oncoming RN.     Yanely Ocampo RN  6/29/2020  6:38 AM

## 2020-06-29 NOTE — PLAN OF CARE
"Discharge Planner OT   Patient plan for discharge: pt feel ready to discharge home today, interested in PHP  Current status: Score of 5.4/5.8 indicates the person may alone and work a job if desired with a large margin of error.  Score also indicates a 14% cognitive assistance needed to assist as needed to anticipate hazards and prevent industrial accidents.  At this level, the pt has the ability to make fine motor adjustments, solve problems for surface and spatial properties of objects, using self directed trial and error, to learn adaptive self care techniques and use of equipment without safety precautions.  She may be inflexible with knowledges of potential secondary effects of actions and insist on using their own methods.  May choose to abandon tasks if too tedious, or ignore assistance regarding potential harmfaul effects.  At this level, pts are at a higher than average risk for having the follow problems at this time: prevent electricall shock by removing electircal appliances from the bathroom, prevent falls in the bath tub by making sure the bath mat is pushed down right, prevent adverse reactions to meds by knowing side effects and possible complications, prevent poor complaicne with meds by pouring into a daily pill box and checking supply and renewing to prevent running out, remove access to power tools, flammables and toxins, restrict access to driving a motor vehicle, provide a weekly and monthly income and assist with long term finances.   Pt states that she is ready to go, \"I feel normal\", when asked what normal is for her she states \"I feel like im on heaven, I have been depressed and anxious for so long, I feel really good now\".  Has been going to groups and reports they are helpful.    Barriers to return to prior living situation: impaired coping skills  Recommendations for discharge: recommend pt return home with outpatient services in place  Rationale for recommendations: clinical reasoning, " score on ACL, pt affect       Entered by: Maria Del Carmen Butterfield 06/29/2020 9:27 AM

## 2020-06-29 NOTE — PLAN OF CARE
Called [facility]'s Department. Vero at the [facility]'s office updated SW that the patient's gun is at their office. The patient can  her gun anytime.     Made referral to [facility] and [facility] services on behalf of patient.

## 2020-06-29 NOTE — DISCHARGE INSTRUCTIONS
Behavioral Discharge Planning and Instructions    Summary: Alycia France is a 75 year old female that was admitted suicidal gesture and took 8 Klonopin, also states that she wanted to shoot herself in the head and has a loaded shotgun in her home.    Main Diagnosis:   Mdd, recurrent, severe with anxious distress  R/o bipolar 2 mixed episode  Sleep apnea requires cpap though does not use    Major Treatments, Procedures and Findings: Stabilize with medications, connect with community programs.    Symptoms to Report: feeling more aggressive, increased confusion, losing more sleep, mood getting worse or thoughts of suicide    Lifestyle Adjustment: Take all medications as prescribed, meet with doctor/ medication provider, out patient therapist, , and [facility] worker as scheduled. Abstain from alcohol or any unprescribed drugs.    Psychiatry Follow-up:     [facility]   [address] [address]  Appointment: 7/06/2020 @ 11:30 am for After Hospital Follow up with  Adalgisa Foote  [phone]  Fax: [phone]    [facility]  [address] [address]  Phone: [phone]    Appointment: 07/23/2020 @ 1:45 pm  with Bruce Harp MD, Psychiatrist for Medication management    Fax: [phone]  Med Records Fax: [phone]    [facility], [facility].  [place] Office  [address]  [place] [address]  Appointment: 07/02/2020 @ 9:00 am with RICHY Mejia, [facility] for individual therapy   Phone: [phone]  Fax: [phone]      [facility]    Appointment: 07/13/2020 @ 10:30 am  *Check into admitting on 1st floor before the first appointment   5th Floor Room 047 [address] [address]  Phone: [phone]. [phone]  Fax: [phone]    [facility]   [facility] [place]   [address] [place] [address]   Mariela:  "[phone]          Resources:   Crisis Intervention: 363.704.3791 or 668-474-7031 (TTY: 880.840.8484).  Call anytime for help.  [place] on Mental Illness (www.[place].mal.org): 648.957.1852 or 306-151-3457.  Alcoholics Anonymous (www.alcoholics-anonymous.org): Check your phone book for your local chapter.  Suicide Awareness Voices of Education (SAVE) (www.save.org): 235-392-LXQP (6723)  National Suicide Prevention Line (www.mentalhealthmn.org): 341-325-MLLX (0475)  Mental Health Consumer/Survivor Network of [place] (www.mhcsn.net): 633.600.3317 or 087-404-8843  Mental Health Association of [place] (www.mentalhealth.org): 907.906.7900 or 613-552-3761    General Medication Instructions:   See your medication sheet(s) for instructions.   Take all medicines as directed.  Make no changes unless your doctor suggests them.   Go to all your doctor visits.  Be sure to have all your required lab tests. This way, your medicines can be refilled on time.  Do not use any drugs not prescribed by your doctor.  Avoid alcohol.    [place] Area:  [facility], Crisis stabilization [facility]- [phone]  [facility] Crisis Line: 1-391.889.7908  Advocates For Family Peace: 348-1336  Sexual Assault Program of [facility]: 494.281.5630 or 1-591.318.8615  [place] Battered Women's Program: 1-126.750.9640 Ext: 279       Calls answered Mon-Fri-8:00 am--4:30 pm    [place]:  [facility]ates for Family Peace: 7-295-004-5114  [facility] - 6-065-595-7741  [facility] first call for help: 1-519.665.7585  [facility] Counseling Crisis Center:  (527) 741-7752    [place] Area:  Warm Line: 1-246.780.6076       Calls answered Tuesday--Saturday 4:00 pm--10:00 pm  [facility] Crisis Line - 268.735.6971  [place] [facility]abil[facility] 754-645-1027    [place] Statewide:  [place] Crisis and Referral Services: 1-641.147.7234  National Suicide Prevention Lifeline: 7-602-638-ALZO (1281)   - oip2ibhj- Text \"Life\" to 65951  First Call for Help: 2-1-1  MAL " "Helpline- 6-606-FDOZ-HELP   Crisis Text Line: Text  [place]  to 631253    Discharge Instructions for COVID-19 Patients  You have--or may have--COVID-19. Please follow the instructions listed below.   If you have a weakened immune system, discuss with your doctor any other actions you need to take.  How can I protect others?  If you have symptoms (fever, cough, body aches or trouble breathing):    Stay home and away from others (self-isolate) until:  ? At least 10 days have passed since your symptoms started. And   ? You've had no fever--and no medicine that reduces fever--for 3 full days (72 hours). And   ? Your other symptoms have resolved (gotten better).  If you don't show symptoms, but testing showed that you have COVID-19:    Stay home and away from others (self-isolate) until at least 10 days have passed since the date of your first positive COVID-19 test.  During this time    Stay in your own room, even for meals. Use your own bathroom if you can.    Stay away from others in your home. No hugging, kissing or shaking hands. No visitors.    Don't go to work, school or anywhere else.    Clean \"high touch\" surfaces often (doorknobs, counters, handles). Use household cleaning spray or wipes. You'll find a full list of  on the EPA website: www.epa.gov/pesticide-registration/list-n-disinfectants-use-against-sars-cov-2.    Cover your mouth and nose with a mask, tissue or wash cloth to avoid spreading germs.    Wash your hands and face often. Use soap and water.    Caregivers in these groups are at risk for severe illness due to COVID-19:  ? People 65 years and older  ? People who live in a nursing home or long-term care facility  ? People with chronic disease (lung, heart, cancer, diabetes, kidney, liver, immunologic)  ? People who have a weakened immune system, including those who:    Are in cancer treatment    Take medicine that weakens the immune system, such as corticosteroids    Had a bone marrow or organ " transplant    Have an immune deficiency    Have poorly controlled HIV or AIDS    Are obese (body mass index of 40 or higher)    Smoke regularly    Caregivers should wear gloves while washing dishes, handling laundry and cleaning bedrooms and bathrooms.    Use caution when washing and drying laundry: Don't shake dirty laundry and use the warmest water setting that you can.    For more tips on managing your health at home, go to www.cdc.gov/coronavirus/2019-ncov/downloads/10Things.pdf.  How can I take care of myself at home?  1. Get lots of rest. Drink extra fluids (unless a doctor has told you not to).  2. Take Tylenol (acetaminophen) for fever or pain. If you have liver or kidney problems, ask your family doctor if it's okay to take Tylenol.     Adults can take either:  ? 650 mg (two 325 mg pills) every 4 to 6 hours, or   ? 1,000 mg (two 500 mg pills) every 8 hours as needed.  ? Note: Don't take more than 3,000 mg in one day. Acetaminophen is found in many medicines (both prescribed and over-the-counter medicines). Read all labels to be sure you don't take too much.   For children, check the Tylenol bottle for the right dose. The dose is based on the child's age or weight.  3. If you have other health problems (like cancer, heart failure, an organ transplant or severe kidney disease): Call your specialty clinic if you don't feel better in the next 2 days.  4. Know when to call 911. Emergency warning signs include:  ? Trouble breathing or shortness of breath  ? Pain or pressure in the chest that doesn't go away  ? Feeling confused like you haven't felt before, or not being able to wake up  ? Bluish-colored lips or face  5. Your doctor may have prescribed a blood thinner medicine. Follow their instructions.  Where can I get more information?    [facility] - About COVID-19:   [email or web address]    CDC - What to Do If You're Sick: www.cdc.gov/coronavirus/2019-ncov/about/steps-when-sick.html    CDC -  Ending Home Isolation: www.cdc.gov/coronavirus/2019-ncov/hcp/disposition-in-home-patients.html    CDC - Caring for Someone: www.cdc.gov/coronavirus/2019-ncov/if-you-are-sick/care-for-someone.html    [facility] - Interim Guidance for Hospital Discharge to Home: www.health.[facility].[place]./diseases/coronavirus/hcp/hospdischarge.pdf    [facility] clinical trials (COVID-19 research studies): clinicalaffairs.[facility]-clinical-trials    Below are the COVID-19 hotlines at the [place] Department of Health ([facility]). Interpreters are available.  ? For health questions: Call 061-430-7865 or 1-422.933.7649 (7 a.m. to 7 p.m.)  ? For questions about schools and childcare: Call 140-957-7466 or 1-971.478.2849 (7 a.m. to 7 p.m.)    For informational purposes only. Not to replace the advice of your health care provider. Clinically reviewed by the Infection Prevention Team.Copyright   2020 [employer]. All rights reserved. [employer] 248265 - 06/20.

## 2020-06-29 NOTE — PLAN OF CARE
Pt is discharging at the recommendation of the treatment team. Pt is discharging to home transported by [employer]. Pt denies having any thoughts of hurting themself or anyone else. Pt denies anxiety or depression. Pt has follow up with [facility], [facility] and [facility]. Discharge instructions, including; demographic sheet, psychiatric evaluation, discharge summary, and AVS were faxed to these next level of care providers.

## 2020-06-29 NOTE — PLAN OF CARE
"  Patient follows recommendations of treatment team.  Compliant with all medications.  Patient remains withdrawn and isolative to her room.   Patient does not attend groups.    Patient reports no difficulties with sleep.   Patient endorses depression, denies SI, HI, AH and VH    Patient is requesting to leave today.  \"I would really like to leave today\"  Educated patient that some things are still being worked on and will let her know as the process progresses.  Patient endorses \"my depression is gone, after the first 3-4 days and groups I'm feeling much better\"    \"I'm trying to stay on as few meds as possible\"  \"I feel normal\"  \"the highest best high I can feel, to cope and do things\"   Discussed in length the leg itching and sneezing. Patient strongly believes it's environmental and will wait it out.     Face to face start of shift report received from Yanely GALLEGOS RN  Patient in room at this time, will continue to monitor.     Problem: Adult Behavioral Health Plan of Care  Goal: Patient-Specific Goal (Individualization)  Description: Pt will follow recommendations of treatment team.  Pt will be compliant with medications.  Pt will attend 50 % of groups.  Pt will sleep 6- 8 hours each night.   Outcome: Improving  Note:   Problem: Fall Injury Risk  Goal: Absence of Fall and Fall-Related Injury  Description: Pt will remain free from fall during hospitalization.  Fall band to remain in place.  Pt will wear appropriate footwear.  Outcome: Improving     "

## 2020-06-30 NOTE — DISCHARGE SUMMARY
"Psychiatric Discharge Summary    Alycia France MRN# [id number]   Age: 75 year old YOB: 1945     Date of Admission:  6/22/2020  Date of Discharge:  6/29/2020  Admitting Physician:  Marlo Block MD  Discharge Physician:  Silva Bobo CNP         Event Leading to Hospitalization and Hospital Stay   Admission:  Alycia France is a 75 year old  female who reports an increase in depression over the past month. Her psychiatrist recently started her on trintillex which she was suppose to  today. She overdosed on 8 klonopin and called EMS. She reported to EMS she had been contemplating suicide and had a loaded gun. EMS confirmed she had a loaded shotgun in her home when they arrived. She is not a good historian and is a bit sedated and very vague when I meet with her. She doesn't appear to have confusion though is sedated. Her dykinesia is obvious. She laid in bed the entire time I tried to interview her and appeared to have truncal and lower extremity dyskinesia which she has been on ingrezza for since 2018.      She tells me she was suppose to pick trintillex up today which was recently prescibed to her. She states she does not have chronic suicidal thoughts. When I ask her if she has chronic suicidal thougths She sarcastically states with an irritable tone \"I just got the gun last week, do you think I just leave guns laying around my house?\" I ask her why she purchased her gun and she states angrily \"its not your business\" though then begins to cry and tells me \"I got it in case I didn't get better\".     She states she is not always \"suicidal, but is always depressed\". When I ask her what her primary symptoms are she states \"I cant do anything. I have no energy or drive\". She states adderall worked for a few years though \"quit working\". When I ask her about manic symptoms she states \"I dont have manic depression, Im never happy\". She sleeps very little at night. She states she doesn't feel tired " "enough to fall asleep. I ask her if she has a lot of anxious thoughts keeping her awake and she states she does though when I discuss this could potentially be racing thoughts contributing to her not be able to relax and sleep at night she states \"they are not racing, I just think about things like \"will I ever feel better\". She states her sleep was not disrupted until a few years ago. She is very tearful. At one point she rolls over in bed, faces the wall and sobs.     Hospital stay:   Alycia was admitted to the behavioral [facility] on a 72 hour hold. Petition for commitment was filed with the [facility] as she was not agreeable to stay in the hospital until stable. After being screened by the formerly [facility] she did agree to stay voluntary in lieu of commitment. Trintellix was started at lower dosing per [employer] testing report. Cytomel was started as augmenting agent. Adderall, Clonazepam, and Lunesta were stopped. She was placed on a Phenobarbital taper which she did complete prior to discharge. There was thought to be correlation between her increase in depression and fatigue to when she started Ingrezza. This was stopped, she was started on Vitamin E. Over the course of her stay her mood and anxiety did improve. She denied any further suicidal thoughts. She was attending group programming and was visible in the milieu. She was agreeable to all recommendations. [facility] and [facility] referrals sent. She has PHP start date of 7/13/20. Has therapy appointment on Friday and follow up with PCP next week until he can follow-up with his psychiatrist at the end of the month.  was able to verify that firearm was removed by police from the home. She reports feeling ready for discharge, denies any suicidal thoughts. Will discharge this afternoon to follow-up with outpatient services including therapist on Friday, PCP next week, PHP start date on 7/13, and psychiatry in 3 weeks.            At time of discharge, there is no evidence " that patient is in immediate danger of self or others.        DIagnoses:   Major depressive disorder, recurrent, severe with anxious distress  R/o bipolar 2 mixed episode  Obstructive sleep apnea- requires cpap though does not use         Labs:     Results for orders placed or performed during the hospital encounter of 06/22/20   Urine Drugs of Abuse Screen Panel 13     Status: Abnormal   Result Value Ref Range    Cannabinoids (09-[id number]-6-[id number]-9-THC) Not Detected NDET^Not Detected ng/mL    Phencyclidine (Phencyclidine) Not Detected NDET^Not Detected ng/mL    Cocaine (Benzoylecgonine) Not Detected NDET^Not Detected ng/mL    Methamphetamine (d-Methamphetamine) Not Detected NDET^Not Detected ng/mL    Opiates (Morphine) Not Detected NDET^Not Detected ng/mL    Amphetamine (d-Amphetamine) Detected, Abnormal Result (A) NDET^Not Detected ng/mL    Benzodiazepines (Nordiazepam) Not Detected NDET^Not Detected ng/mL    Tricyclic Antidepressants (Desipramine) Not Detected NDET^Not Detected ng/mL    Methadone (Methadone) Not Detected NDET^Not Detected ng/mL    Barbiturates (Butalbital) Detected, Abnormal Result (A) NDET^Not Detected ng/mL    Oxycodone (Oxycodone) Not Detected NDET^Not Detected ng/mL    Propoxyphene (Norpropoxyphene) Not Detected NDET^Not Detected ng/mL    Buprenorphine (Buprenorphine) Not Detected NDET^Not Detected ng/mL   CBC with platelets differential     Status: None   Result Value Ref Range    WBC 5.7 4.0 - 11.0 10e9/L    RBC Count 3.92 3.8 - 5.2 10e12/L    Hemoglobin 12.1 11.7 - 15.7 g/dL    Hematocrit 37.1 35.0 - 47.0 %    MCV 95 78 - 100 fl    MCH 30.9 26.5 - 33.0 pg    MCHC 32.6 31.5 - 36.5 g/dL    RDW 12.9 10.0 - 15.0 %    Platelet Count 284 150 - 450 10e9/L    Diff Method Automated Method     % Neutrophils 44.0 %    % Lymphocytes 41.6 %    % Monocytes 10.0 %    % Eosinophils 2.8 %    % Basophils 1.2 %    % Immature Granulocytes 0.4 %    Nucleated RBCs 0 0 /100    Absolute Neutrophil 2.5 1.6 - 8.3  10e9/L    Absolute Lymphocytes 2.4 0.8 - 5.3 10e9/L    Absolute Monocytes 0.6 0.0 - 1.3 10e9/L    Absolute Eosinophils 0.2 0.0 - 0.7 10e9/L    Absolute Basophils 0.1 0.0 - 0.2 10e9/L    Abs Immature Granulocytes 0.0 0 - 0.4 10e9/L    Absolute Nucleated RBC 0.0    Comprehensive metabolic panel     Status: Abnormal   Result Value Ref Range    Sodium 142 133 - 144 mmol/L    Potassium 4.5 3.4 - 5.3 mmol/L    Chloride 111 (H) 94 - 109 mmol/L    Carbon Dioxide 27 20 - 32 mmol/L    Anion Gap 4 3 - 14 mmol/L    Glucose 90 70 - 99 mg/dL    Urea Nitrogen 14 7 - 30 mg/dL    Creatinine 1.02 0.52 - 1.04 mg/dL    GFR Estimate 53 (L) >60 mL/min/[1.73_m2]    GFR Estimate If Black 62 >60 mL/min/[1.73_m2]    Calcium 8.8 8.5 - 10.1 mg/dL    Bilirubin Total 0.2 0.2 - 1.3 mg/dL    Albumin 3.4 3.4 - 5.0 g/dL    Protein Total 6.6 (L) 6.8 - 8.8 g/dL    Alkaline Phosphatase 41 40 - 150 U/L    ALT 21 0 - 50 U/L    AST 15 0 - 45 U/L   Asymptomatic COVID-19 Virus (Coronavirus) by PCR     Status: None    Specimen: Nasopharyngeal   Result Value Ref Range    COVID-19 Virus PCR to [facility] - Source Nasopharyngeal     COVID-19 Virus PCR to [facility] - Result       Test received-See reflex to [place] test SARS CoV2 (COVID-19) Virus RT-PCR   Acetaminophen level     Status: Abnormal   Result Value Ref Range    Acetaminophen Level <2 (L) 10 - 20 mg/L   Salicylate level     Status: None   Result Value Ref Range    Salicylate Level 2 mg/dL   SARS-CoV-2 COVID-19 Virus (Coronavirus) RT-PCR Nasopharyngeal     Status: None    Specimen: Nasopharyngeal   Result Value Ref Range    SARS-CoV-2 Virus Specimen Source Nasopharyngeal     SARS-CoV-2 PCR Result NEGATIVE     SARS-CoV-2 PCR Comment       Testing was performed using the [employer] X[employer] SARS-CoV-2 Assay on the Cepheid Gene-Xpert   Instrument Systems. Additional information about this Emergency Use Authorization (EUA)   assay can be found via the Lab Guide.     UA with Microscopic reflex to Culture      Status: Abnormal    Specimen: Midstream Urine   Result Value Ref Range    Color Urine Yellow     Appearance Urine Clear     Glucose Urine Negative NEG^Negative mg/dL    Bilirubin Urine Negative NEG^Negative    Ketones Urine Negative NEG^Negative mg/dL    Specific Gravity Urine 1.025 1.003 - 1.035    Blood Urine Negative NEG^Negative    pH Urine 5.5 4.7 - 8.0 pH    Protein Albumin Urine Negative NEG^Negative mg/dL    Urobilinogen mg/dL Normal 0.0 - 2.0 mg/dL    Nitrite Urine Negative NEG^Negative    Leukocyte Esterase Urine Negative NEG^Negative    Source Midstream Urine     WBC Urine 1 0 - 5 /HPF    RBC Urine 1 0 - 2 /HPF    Bacteria Urine Few (A) NEG^Negative /HPF    Mucous Urine Present (A) NEG^Negative /LPF   TSH with free T4 reflex     Status: None   Result Value Ref Range    TSH 2.38 0.40 - 4.00 mU/L   Vitamin D     Status: None   Result Value Ref Range    Vitamin D Deficiency screening 37 20 - 75 ug/L              Discharge Medications:     Discharge Medication List as of 6/29/2020  1:52 PM      START taking these medications    Details   lactobacillus rhamnosus, GG, (CULTURELL) capsule Take 1 capsule by mouth 2 times daily, Disp-60 capsule,R-0, Local Print      liothyronine (CYTOMEL) 5 MCG tablet Take 2 tablets (10 mcg) by mouth daily, Disp-30 tablet,R-0, E-Prescribe      vitamin E (TOCOPHEROL) 600 units capsule Take 2 capsules (1,200 Units) by mouth daily, Disp-60 capsule,R-0, E-Prescribe         CONTINUE these medications which have NOT CHANGED    Details   gabapentin (NEURONTIN) 100 MG capsule Take 100 mg by mouth At Bedtime, Historical      solifenacin (VESICARE) 10 MG tablet Take 10 mg by mouth daily, Historical      Vitamin D, Cholecalciferol, 25 MCG (1000 UT) CAPS Take 2,000 Units by mouth daily, Historical      vortioxetine (TRINTELLIX) 5 MG tablet Take 5 mg by mouth daily (Start with 1/2 tab for 6 days and then increase to 1 full tablet.), Historical         STOP taking these medications        amphetamine-dextroamphetamine (ADDERALL) 20 MG tablet Comments:   Reason for Stopping:         ciprofloxacin (CIPRO) 500 MG tablet Comments:   Reason for Stopping:         clonazePAM (KLONOPIN) 1 MG tablet Comments:   Reason for Stopping:         eszopiclone (LUNESTA) 3 MG tablet Comments:   Reason for Stopping:         hydrOXYzine (ATARAX) 25 MG tablet Comments:   Reason for Stopping:         phenazopyridine (PYRIDIUM) 100 MG tablet Comments:   Reason for Stopping:         valbenazine (INGREZZA) 40 MG capsule Comments:   Reason for Stopping:                 Justification for dual anti-psychotic use: not applicable       Psychiatric Examination:   Appearance:  awake, alert, adequately groomed and appeared as age stated  Attitude:  cooperative  Eye Contact:  good  Mood:  better  Affect:  Less blunted than admission  Speech:  clear, coherent  Psychomotor Behavior: mild evidence of tardive dyskinesia  Thought Process:  logical, linear and goal oriented  Associations:  no loose associations  Thought Content:  no evidence of suicidal ideation or homicidal ideation and no evidence of psychotic thought  Insight:  fair  Judgment:  fair  Oriented to:  time, person, and place  Attention Span and Concentration:  intact  Recent and Remote Memory:  intact  Fund of Knowledge: appropriate  Muscle Strength and Tone: normal  Gait and Station: Normal  Perception: no perceptual disturbances noted       Discharge Plan:   Psychiatry Follow-up:     [facility]   [address] [facility] [address]  Appointment: 7/06/2020 @ 11:30 am for After Hospital Follow up with  Adalgisa Foote  [phone]  Fax: [phone]    [facility] Office  [address] [address]  Phone: [phone]    Appointment: 07/23/2020 @ 1:45 pm  with Brcue Harp MD, Psychiatrist for Medication management    Fax: [phone]  Med Records Fax: [phone]    [facility],  [place].  [place] Office  [address]  [place] [address]  Appointment: 07/02/2020 @ 9:00 am with RICHY Mejia, [facility] for individual therapy   Phone: [phone]  Fax: [phone]      [facility]    Appointment: 07/13/2020 @ 10:30 am  *Check into admitting on 1st floor before the first appointment   5th Floor Room [address] [place] [address]   [place] [address]  Phone: [phone]. [phone]  Fax: [phone]    [facility]   [address] [place] [address]  [place] [id number]   Mariela: [phone]        Attestation:  The patient has been seen and evaluated by me,  Silva Bobo, LYNDSEY         Discharge Services Provided:    45 minutes spent on discharge services, including:  Final examination of patient.  Review and discussion of Hospital stay.  Instructions for continued outpatient care/goals.  Preparation of discharge records.  Preparation of medications refills and new prescriptions.

## 2020-06-30 NOTE — TELEPHONE ENCOUNTER
Pt referred to [facility] by Erlinda Velazquez sent.    [facility] scheduled 7.13.20 @ 10:30a